# Patient Record
Sex: FEMALE | Race: BLACK OR AFRICAN AMERICAN | NOT HISPANIC OR LATINO | Employment: FULL TIME | URBAN - METROPOLITAN AREA
[De-identification: names, ages, dates, MRNs, and addresses within clinical notes are randomized per-mention and may not be internally consistent; named-entity substitution may affect disease eponyms.]

---

## 2021-08-21 ENCOUNTER — HOSPITAL ENCOUNTER (EMERGENCY)
Facility: HOSPITAL | Age: 41
End: 2021-08-22
Attending: EMERGENCY MEDICINE | Admitting: EMERGENCY MEDICINE
Payer: COMMERCIAL

## 2021-08-21 ENCOUNTER — APPOINTMENT (EMERGENCY)
Dept: CT IMAGING | Facility: HOSPITAL | Age: 41
End: 2021-08-21
Payer: COMMERCIAL

## 2021-08-21 DIAGNOSIS — R29.90 STROKE-LIKE SYMPTOMS: Primary | ICD-10-CM

## 2021-08-21 LAB
ANION GAP SERPL CALCULATED.3IONS-SCNC: 9 MMOL/L (ref 4–13)
APTT PPP: 26 SECONDS (ref 23–37)
BUN SERPL-MCNC: 14 MG/DL (ref 7–25)
CALCIUM SERPL-MCNC: 10.1 MG/DL (ref 8.6–10.5)
CHLORIDE SERPL-SCNC: 96 MMOL/L (ref 98–107)
CO2 SERPL-SCNC: 28 MMOL/L (ref 21–31)
CREAT SERPL-MCNC: 0.91 MG/DL (ref 0.6–1.2)
ERYTHROCYTE [DISTWIDTH] IN BLOOD BY AUTOMATED COUNT: 13 % (ref 11.5–14.5)
GFR SERPL CREATININE-BSD FRML MDRD: 92 ML/MIN/1.73SQ M
GLUCOSE SERPL-MCNC: 106 MG/DL (ref 65–99)
GLUCOSE SERPL-MCNC: 114 MG/DL (ref 65–140)
HCG SERPL QL: NEGATIVE
HCT VFR BLD AUTO: 39.1 % (ref 42–47)
HGB BLD-MCNC: 13.4 G/DL (ref 12–16)
INR PPP: 0.95 (ref 0.84–1.19)
MCH RBC QN AUTO: 31.5 PG (ref 26–34)
MCHC RBC AUTO-ENTMCNC: 34.3 G/DL (ref 31–37)
MCV RBC AUTO: 92 FL (ref 81–99)
PLATELET # BLD AUTO: 268 THOUSANDS/UL (ref 149–390)
PMV BLD AUTO: 9.7 FL (ref 8.6–11.7)
POTASSIUM SERPL-SCNC: 3.3 MMOL/L (ref 3.5–5.5)
PROTHROMBIN TIME: 12.6 SECONDS (ref 11.6–14.5)
RBC # BLD AUTO: 4.25 MILLION/UL (ref 3.9–5.2)
SARS-COV-2 RNA RESP QL NAA+PROBE: NEGATIVE
SODIUM SERPL-SCNC: 133 MMOL/L (ref 134–143)
TROPONIN I SERPL-MCNC: <0.03 NG/ML
WBC # BLD AUTO: 10.1 THOUSAND/UL (ref 4.8–10.8)

## 2021-08-21 PROCEDURE — 99285 EMERGENCY DEPT VISIT HI MDM: CPT

## 2021-08-21 PROCEDURE — 85610 PROTHROMBIN TIME: CPT | Performed by: EMERGENCY MEDICINE

## 2021-08-21 PROCEDURE — 85730 THROMBOPLASTIN TIME PARTIAL: CPT | Performed by: EMERGENCY MEDICINE

## 2021-08-21 PROCEDURE — U0003 INFECTIOUS AGENT DETECTION BY NUCLEIC ACID (DNA OR RNA); SEVERE ACUTE RESPIRATORY SYNDROME CORONAVIRUS 2 (SARS-COV-2) (CORONAVIRUS DISEASE [COVID-19]), AMPLIFIED PROBE TECHNIQUE, MAKING USE OF HIGH THROUGHPUT TECHNOLOGIES AS DESCRIBED BY CMS-2020-01-R: HCPCS | Performed by: EMERGENCY MEDICINE

## 2021-08-21 PROCEDURE — 82948 REAGENT STRIP/BLOOD GLUCOSE: CPT

## 2021-08-21 PROCEDURE — G1004 CDSM NDSC: HCPCS

## 2021-08-21 PROCEDURE — 80048 BASIC METABOLIC PNL TOTAL CA: CPT | Performed by: EMERGENCY MEDICINE

## 2021-08-21 PROCEDURE — 84703 CHORIONIC GONADOTROPIN ASSAY: CPT | Performed by: EMERGENCY MEDICINE

## 2021-08-21 PROCEDURE — 70496 CT ANGIOGRAPHY HEAD: CPT

## 2021-08-21 PROCEDURE — 99285 EMERGENCY DEPT VISIT HI MDM: CPT | Performed by: EMERGENCY MEDICINE

## 2021-08-21 PROCEDURE — 70498 CT ANGIOGRAPHY NECK: CPT

## 2021-08-21 PROCEDURE — 85027 COMPLETE CBC AUTOMATED: CPT | Performed by: EMERGENCY MEDICINE

## 2021-08-21 PROCEDURE — U0005 INFEC AGEN DETEC AMPLI PROBE: HCPCS | Performed by: EMERGENCY MEDICINE

## 2021-08-21 PROCEDURE — 93005 ELECTROCARDIOGRAM TRACING: CPT

## 2021-08-21 PROCEDURE — 84484 ASSAY OF TROPONIN QUANT: CPT | Performed by: EMERGENCY MEDICINE

## 2021-08-21 PROCEDURE — 36415 COLL VENOUS BLD VENIPUNCTURE: CPT | Performed by: EMERGENCY MEDICINE

## 2021-08-21 RX ORDER — CLOPIDOGREL BISULFATE 75 MG/1
75 TABLET ORAL ONCE
Status: COMPLETED | OUTPATIENT
Start: 2021-08-21 | End: 2021-08-21

## 2021-08-21 RX ORDER — VALSARTAN 80 MG/1
80 TABLET ORAL DAILY
Status: ON HOLD | COMMUNITY
Start: 2021-05-08 | End: 2021-08-23 | Stop reason: SDUPTHER

## 2021-08-21 RX ORDER — HYDROCHLOROTHIAZIDE 25 MG/1
TABLET ORAL
COMMUNITY
Start: 2021-05-26

## 2021-08-21 RX ORDER — AMLODIPINE BESYLATE 10 MG/1
10 TABLET ORAL DAILY
COMMUNITY
Start: 2021-05-08

## 2021-08-21 RX ORDER — ATORVASTATIN CALCIUM 40 MG/1
40 TABLET, FILM COATED ORAL
Status: DISCONTINUED | OUTPATIENT
Start: 2021-08-22 | End: 2021-08-22 | Stop reason: HOSPADM

## 2021-08-21 RX ORDER — ASPIRIN 325 MG
325 TABLET ORAL ONCE
Status: COMPLETED | OUTPATIENT
Start: 2021-08-21 | End: 2021-08-21

## 2021-08-21 RX ADMIN — ASPIRIN 325 MG: 325 TABLET ORAL at 22:30

## 2021-08-21 RX ADMIN — IOHEXOL 85 ML: 350 INJECTION, SOLUTION INTRAVENOUS at 19:26

## 2021-08-21 RX ADMIN — CLOPIDOGREL BISULFATE 75 MG: 75 TABLET ORAL at 22:30

## 2021-08-21 NOTE — QUICK NOTE
Stroke alert activated by Dr Fernanda Kaufman in the [address] [place] [address] ED ([phone]) via the emergency       Patient is 45year old woman with hx of HTN, presenting with R sided face, arm, and leg paresthesia  Symptoms began about 4pm   Patient was taking a nap at the time, and the R sided paresthesias began  They continued when she woke up, and a family friend urged her to go to the ED  On exam:   /88  Awake, alert, follows commands  Normal language  No facial weakness, not dysarthric  Sensation in face is normal    Weakness in R , R biceps, R deltoid, R hip flexion, and knee extension  Sensory in arms leg normal     GAIT normal      NIHSS = 0    CTH images reviewed: no hemorrhage  Grey white differentiate intact  CTA H&N images reviewed: no significant atherosclerotic changes in the cervical carotid or vertebral arteries  No large vessel occlusion  AP:    45year old woman with HTN, presenting with R sided paresthesias  NIHSS is 0, but per ED physician, there is weakness, and asymmetry on confrontational motor exam   No findings on her imaging  Cannot rule out a stroke  Does have vascular risk factors  From chart review, should be on 3 antihypertensives, so might be difficult to control HTN  No contraindications to receiving TPA  At this point, would recommend the thrombolytics, and admit for post TPA care, and a stroke evaluation  Formal neurology evaluation

## 2021-08-21 NOTE — ED PROVIDER NOTES
History  Chief Complaint   Patient presents with    Numbness     Pt states numbness on right side of body, right face  Patient is a very pleasant 71-year-old female presenting with right-sided paresthesias and weakness which started approximately 4:00 p m  Patient states she was laying down to go to sleep and she started feeling paresthesias in the right side of her face, but of thought she was cyst and laying in a weird position, and was able to sleep  When she woke up several minutes prior to arrival the paresthesias continue to the entire right side of her body with mild weakness  She denies any headache, vision changes  Patient states she has had history of paresthesias in the past, as well as palpitations however there is no formal diagnosis made  She is not on any blood thinners  She also has a history of hypertension which has been difficult to control with 3 different antihypertensive medications with no recent changes  Extremity Weakness  Location:  Right face, upper, lower extremity  Quality:  Paresthesia, weak  Severity:  Mild  Onset quality:  Sudden  Duration:  3 hours  Timing:  Constant  Progression:  Unchanged  Chronicity:  New  Context:  None  Relieved by:  None  Worsened by:  None  Ineffective treatments:  None  Associated symptoms: no abdominal pain, no chest pain, no congestion, no cough, no diarrhea, no fever, no nausea, no rash, no rhinorrhea, no sore throat, no vomiting and no wheezing        Prior to Admission Medications   Prescriptions Last Dose Informant Patient Reported? Taking?    amLODIPine (NORVASC) 10 mg tablet 8/21/2021 at Unknown time  Yes Yes   Sig: Take 10 mg by mouth daily   hydrochlorothiazide (HYDRODIURIL) 25 mg tablet 8/21/2021 at Unknown time  Yes Yes   Sig: TAKE 1 TABLET BY MOUTH EVERY DAY   valsartan (DIOVAN) 80 mg tablet 8/20/2021 at Unknown time  Yes Yes   Sig: Take 80 mg by mouth daily      Facility-Administered Medications: None       Past Medical History: Diagnosis Date    Hypertension        History reviewed  No pertinent surgical history  History reviewed  No pertinent family history  I have reviewed and agree with the history as documented  E-Cigarette/Vaping     E-Cigarette/Vaping Substances     Social History     Tobacco Use    Smoking status: Never Smoker    Smokeless tobacco: Never Used   Substance Use Topics    Alcohol use: Yes     Comment: Social    Drug use: Never       Review of Systems   Constitutional: Negative for chills and fever  HENT: Negative for congestion, nosebleeds, rhinorrhea and sore throat  Eyes: Negative for pain and visual disturbance  Respiratory: Negative for cough and wheezing  Cardiovascular: Negative for chest pain and leg swelling  Gastrointestinal: Negative for abdominal distention, abdominal pain, diarrhea, nausea and vomiting  Genitourinary: Negative for dysuria and frequency  Musculoskeletal: Positive for extremity weakness  Negative for back pain and joint swelling  Skin: Negative for rash and wound  Neurological: Positive for weakness  Negative for numbness  Psychiatric/Behavioral: Negative for decreased concentration and suicidal ideas  Physical Exam  Physical Exam  Vitals and nursing note reviewed  Constitutional:       Appearance: She is well-developed  HENT:      Head: Normocephalic and atraumatic  Eyes:      Conjunctiva/sclera: Conjunctivae normal       Pupils: Pupils are equal, round, and reactive to light  Neck:      Trachea: No tracheal deviation  Cardiovascular:      Rate and Rhythm: Normal rate and regular rhythm  Heart sounds: Normal heart sounds  No murmur heard  Pulmonary:      Effort: Pulmonary effort is normal  No respiratory distress  Breath sounds: Normal breath sounds  No wheezing or rales  Abdominal:      General: Bowel sounds are normal  There is no distension  Palpations: Abdomen is soft  Tenderness:  There is no abdominal tenderness  Musculoskeletal:         General: No deformity  Cervical back: Normal range of motion and neck supple  Skin:     General: Skin is warm and dry  Capillary Refill: Capillary refill takes less than 2 seconds  Neurological:      Mental Status: She is alert and oriented to person, place, and time  GCS: GCS eye subscore is 4  GCS verbal subscore is 5  GCS motor subscore is 6  Cranial Nerves: Cranial nerves are intact  No cranial nerve deficit  Sensory: Sensation is intact  No sensory deficit  Motor: Weakness present  Coordination: Coordination is intact  Gait: Gait is intact  Comments: No drift, normal facial expression  She does have noticeable decreased strength RUE/RLE in flexion and extension of all joints  She is right hand dominant and states her right side is typically stronger than her left    Sensation is intact bilaterally   Psychiatric:         Judgment: Judgment normal          Vital Signs  ED Triage Vitals [08/21/21 1850]   Temperature Pulse Respirations Blood Pressure SpO2   98 5 °F (36 9 °C) 75 18 127/74 99 %      Temp Source Heart Rate Source Patient Position - Orthostatic VS BP Location FiO2 (%)   Oral Monitor Sitting Left arm --      Pain Score       --           Vitals:    08/21/21 2045 08/21/21 2100 08/21/21 2200 08/21/21 2300   BP: 117/85 120/81 114/69 106/60   Pulse: 75 68 67 75   Patient Position - Orthostatic VS: Sitting Sitting Sitting Lying         Visual Acuity  Visual Acuity      Most Recent Value   L Pupil Size (mm)  3   R Pupil Size (mm)  3   L Pupil Shape  Round   R Pupil Shape  Round          ED Medications  Medications   atorvastatin (LIPITOR) tablet 40 mg (has no administration in time range)   iohexol (OMNIPAQUE) 350 MG/ML injection (SINGLE-DOSE) 85 mL (85 mL Intravenous Given 8/21/21 1926)   aspirin tablet 325 mg (325 mg Oral Given 8/21/21 2230)   clopidogrel (PLAVIX) tablet 75 mg (75 mg Oral Given 8/21/21 2230) Diagnostic Studies  Results Reviewed     Procedure Component Value Units Date/Time    Rapid drug screen, urine [[id number]]     Lab Status: No result Specimen: Urine     hCG, qualitative pregnancy [[id number]]  (Normal) Collected: 08/21/21 1913    Lab Status: Final result Specimen: Blood from Arm, Right Updated: 08/21/21 2257     Preg, Serum Negative    Novel Coronavirus (Covid-19),PCR [facility] - 2 hour stat [[id number]]  (Normal) Collected: 08/21/21 1938    Lab Status: Final result Specimen: Nares from Nose Updated: 08/21/21 2041     SARS-CoV-2 Negative    Narrative: The specimen collection materials, transport medium, and/or testing methodology utilized in the production of these test results have been proven to be reliable in a limited validation with an abbreviated program under the Emergency Utilization Authorization provided by the FDA  Testing reported as "Presumptive positive" will be confirmed with secondary testing to ensure result accuracy  Clinical caution and judgement should be used with the interpretation of these results with consideration of the clinical impression and other laboratory testing  Testing reported as "Positive" or "Negative" has been proven to be accurate according to standard laboratory validation requirements  All testing is performed with control materials showing appropriate reactivity at standard intervals        Troponin I [[id number]]  (Normal) Collected: 08/21/21 1913    Lab Status: Final result Specimen: Blood from Arm, Right Updated: 08/21/21 1938     Troponin I <0 03 ng/mL     Basic metabolic panel [[id number]]  (Abnormal) Collected: 08/21/21 1913    Lab Status: Final result Specimen: Blood from Arm, Right Updated: 08/21/21 1938     Sodium 133 mmol/L      Potassium 3 3 mmol/L      Chloride 96 mmol/L      CO2 28 mmol/L      ANION GAP 9 mmol/L      BUN 14 mg/dL      Creatinine 0 91 mg/dL      Glucose 106 mg/dL      Calcium 10 1 mg/dL      eGFR 92 ml/min/1 73sq m Narrative:      National Kidney Disease Foundation guidelines for Chronic Kidney Disease (CKD):     Stage 1 with normal or high GFR (GFR > 90 mL/min/1 73 square meters)    Stage 2 Mild CKD (GFR = 60-89 mL/min/1 73 square meters)    Stage 3A Moderate CKD (GFR = 45-59 mL/min/1 73 square meters)    Stage 3B Moderate CKD (GFR = 30-44 mL/min/1 73 square meters)    Stage 4 Severe CKD (GFR = 15-29 mL/min/1 73 square meters)    Stage 5 End Stage CKD (GFR <15 mL/min/1 73 square meters)  Note: GFR calculation is accurate only with a steady state creatinine    Protime-INR [[id number]]  (Normal) Collected: 08/21/21 1913    Lab Status: Final result Specimen: Blood from Arm, Right Updated: 08/21/21 1931     Protime 12 6 seconds      INR 0 95    APTT [[id number]]  (Normal) Collected: 08/21/21 1913    Lab Status: Final result Specimen: Blood from Arm, Right Updated: 08/21/21 1931     PTT 26 seconds     CBC and Platelet [[id number]]  (Abnormal) Collected: 08/21/21 1913    Lab Status: Final result Specimen: Blood from Arm, Right Updated: 08/21/21 1920     WBC 10 10 Thousand/uL      RBC 4 25 Million/uL      Hemoglobin 13 4 g/dL      Hematocrit 39 1 %      MCV 92 fL      MCH 31 5 pg      MCHC 34 3 g/dL      RDW 13 0 %      Platelets 513 Thousands/uL      MPV 9 7 fL     Fingerstick Glucose (POCT) [[id number]]  (Normal) Collected: 08/21/21 1908    Lab Status: Final result Updated: 08/21/21 1909     POC Glucose 114 mg/dl                  CTA stroke alert (head/neck)   Final Result by Alcides Schwartz MD (08/21 1957)      No evidence of hemodynamic significant stenosis, aneurysm or dissection  I personally discussed this study with Natacha Archer on 8/21/2021 at 7:22 PM                         Workstation performed: [id number]         CT stroke alert brain   Final Result by Alcides Schwartz MD (08/21 1946)      No acute intracranial abnormality               I personally discussed this study with Natacha Archer on 8/21/2021 at 7:22 PM                 Workstation performed: [id number]                    Procedures  Procedures         ED Course  ED Course as of Aug 21 2310   Sat Aug 21, 2021   2009 After discussion with Dr Alex Brantley, I discussed tPA with patient in detail and offerred it to her  She has had similar symptoms and recovered well, and she does not want tPA  Will admit for stroke protocol      2222 Did Confirm MRI is available, [facility] requests transfer as patient was inside window, young, and would benefit from in person neuro consult                    Stroke Assessment     Row Name 08/21/21 1908             NIH Stroke Scale    Interval  Baseline      Level of Consciousness (1a )  0      LOC Questions (1b )  0      LOC Commands (1c )  0      Best Gaze (2 )  0      Visual (3 )  0      Facial Palsy (4 )  0      Motor Arm, Left (5a )  0      Motor Arm, Right (5b )  0 decreased  strength      Motor Leg, Left (6a )  0      Motor Leg, Right (6b )  0 decreased hip flexion, knee extension, plantar flexion      Limb Ataxia (7 )  0      Sensory (8 )  0      Best Language (9 )  0      Dysarthria (10 )  0      Extinction and Inattention (11 ) (Formerly Neglect)  0      Total  0                                  MDM  Number of Diagnoses or Management Options  Stroke-like symptoms: new and requires workup  Diagnosis management comments: Patient is a 78-year-old female presenting with a paresthesias on the right side  Interestingly her sensation is intact however she does have decreased  strength, elbow flexion, knee extension, hip flexion  She does not have any drift, so her NIHSS is 0, and is not likely tPA candidate  She may become candidate for tPA if symptoms worsen  She does have a history of difficult-to-control blood pressure, as well as palpitations however I do not see any Holter monitor records, perhaps pAF? No palpitations prior to the onset of symptoms         Amount and/or Complexity of Data Reviewed  Review and summarize past medical records: yes  Independent visualization of images, tracings, or specimens: yes    Risk of Complications, Morbidity, and/or Mortality  Presenting problems: high  Diagnostic procedures: minimal  Management options: high        Disposition  Final diagnoses:   Stroke-like symptoms     Time reflects when diagnosis was documented in both MDM as applicable and the Disposition within this note     Time User Action Codes Description Comment    8/21/2021 10:37 PM Kianna Polio Add [R20 2] Paresthesias     8/21/2021 10:37 PM Kianna Polio Remove [R20 2] Paresthesias     8/21/2021 10:37 PM Kianna Polio Add [R29 90] Stroke-like symptoms       ED Disposition     ED Disposition Condition Date/Time Comment    Transfer to Another Facility-In Network  WE Aug 21, 2021 10:21 PM Franci Wood should be transferred out to [facility] Documentation      Most Recent Value   Patient Condition  The patient has been stabilized such that within reasonable medical probability, no material deterioration of the patient condition or the condition of the unborn child(bacilio) is likely to result from the transfer   Reason for Transfer  Level of Care needed not available at this facility   Benefits of Transfer  Specialized equipment and/or services available at the receiving facility (Include comment)________________________   Risks of Transfer  Potential for delay in receiving treatment, Potential deterioration of medical condition, Loss of IV, Increased discomfort during transfer, Possible worsening of condition or death during transfer   Accepting Physician  Narcisa Carpenter MD  [place] [facility]   Provider Certification  Risk of worsening condition, Unanticipated needs of medical equipment and personnel during transport, General risk, such as traffic hazards, adverse weather conditions, rough terrain or turbulence, possible failure of equipment (including vehicle or aircraft), or consequences of actions of persons outside the control of the transport personnel, The possibility of a transport vehicle being unavailable      Follow-up Information    None         Patient's Medications   Discharge Prescriptions    No medications on file     No discharge procedures on file      PDMP Review     None          ED Provider  Electronically Signed by           Radha Daniels DO  08/21/21 8675

## 2021-08-22 ENCOUNTER — APPOINTMENT (INPATIENT)
Dept: MRI IMAGING | Facility: HOSPITAL | Age: 41
DRG: 103 | End: 2021-08-22
Payer: COMMERCIAL

## 2021-08-22 ENCOUNTER — HOSPITAL ENCOUNTER (INPATIENT)
Facility: HOSPITAL | Age: 41
LOS: 1 days | Discharge: HOME | DRG: 103 | End: 2021-08-23
Attending: INTERNAL MEDICINE | Admitting: INTERNAL MEDICINE
Payer: COMMERCIAL

## 2021-08-22 VITALS
HEIGHT: 61 IN | BODY MASS INDEX: 23.6 KG/M2 | DIASTOLIC BLOOD PRESSURE: 64 MMHG | HEART RATE: 74 BPM | OXYGEN SATURATION: 98 % | WEIGHT: 125 LBS | TEMPERATURE: 98.5 F | RESPIRATION RATE: 18 BRPM | SYSTOLIC BLOOD PRESSURE: 110 MMHG

## 2021-08-22 DIAGNOSIS — R29.90 STROKE-LIKE SYMPTOMS: Primary | ICD-10-CM

## 2021-08-22 DIAGNOSIS — I10 HTN (HYPERTENSION): ICD-10-CM

## 2021-08-22 LAB
AMPHETAMINES SERPL QL SCN: NEGATIVE
ANION GAP SERPL CALCULATED.3IONS-SCNC: 10 MMOL/L (ref 4–13)
ATRIAL RATE: 83 BPM
BARBITURATES UR QL: NEGATIVE
BENZODIAZ UR QL: NEGATIVE
BUN SERPL-MCNC: 17 MG/DL (ref 5–25)
CALCIUM SERPL-MCNC: 8.6 MG/DL (ref 8.3–10.1)
CHLORIDE SERPL-SCNC: 99 MMOL/L (ref 100–108)
CHOLEST SERPL-MCNC: 195 MG/DL (ref 50–200)
CO2 SERPL-SCNC: 27 MMOL/L (ref 21–32)
COCAINE UR QL: NEGATIVE
CREAT SERPL-MCNC: 0.88 MG/DL (ref 0.6–1.3)
EST. AVERAGE GLUCOSE BLD GHB EST-MCNC: 108 MG/DL
GFR SERPL CREATININE-BSD FRML MDRD: 96 ML/MIN/1.73SQ M
GLUCOSE SERPL-MCNC: 97 MG/DL (ref 65–140)
HBA1C MFR BLD: 5.4 %
HDLC SERPL-MCNC: 89 MG/DL
LDLC SERPL CALC-MCNC: 99 MG/DL (ref 0–100)
MAGNESIUM SERPL-MCNC: 2.1 MG/DL (ref 1.6–2.6)
METHADONE UR QL: NEGATIVE
OPIATES UR QL SCN: NEGATIVE
OXYCODONE+OXYMORPHONE UR QL SCN: NEGATIVE
P AXIS: 73 DEGREES
PCP UR QL: NEGATIVE
PHOSPHATE SERPL-MCNC: 4.7 MG/DL (ref 2.7–4.5)
POTASSIUM SERPL-SCNC: 3.4 MMOL/L (ref 3.5–5.3)
PR INTERVAL: 156 MS
QRS AXIS: 61 DEGREES
QRSD INTERVAL: 82 MS
QT INTERVAL: 382 MS
QTC INTERVAL: 448 MS
SODIUM SERPL-SCNC: 136 MMOL/L (ref 136–145)
T WAVE AXIS: 53 DEGREES
THC UR QL: NEGATIVE
TRIGL SERPL-MCNC: 34 MG/DL
TSH SERPL DL<=0.05 MIU/L-ACNC: 1.18 UIU/ML (ref 0.36–3.74)
VENTRICULAR RATE: 83 BPM
VIT B12 SERPL-MCNC: 452 PG/ML (ref 100–900)

## 2021-08-22 PROCEDURE — 80307 DRUG TEST PRSMV CHEM ANLYZR: CPT | Performed by: INTERNAL MEDICINE

## 2021-08-22 PROCEDURE — 84100 ASSAY OF PHOSPHORUS: CPT | Performed by: PHYSICIAN ASSISTANT

## 2021-08-22 PROCEDURE — 93010 ELECTROCARDIOGRAM REPORT: CPT | Performed by: INTERNAL MEDICINE

## 2021-08-22 PROCEDURE — 80061 LIPID PANEL: CPT | Performed by: PHYSICIAN ASSISTANT

## 2021-08-22 PROCEDURE — 83036 HEMOGLOBIN GLYCOSYLATED A1C: CPT | Performed by: PHYSICIAN ASSISTANT

## 2021-08-22 PROCEDURE — 70551 MRI BRAIN STEM W/O DYE: CPT

## 2021-08-22 PROCEDURE — 99222 1ST HOSP IP/OBS MODERATE 55: CPT | Performed by: INTERNAL MEDICINE

## 2021-08-22 PROCEDURE — 84443 ASSAY THYROID STIM HORMONE: CPT | Performed by: PHYSICIAN ASSISTANT

## 2021-08-22 PROCEDURE — 80048 BASIC METABOLIC PNL TOTAL CA: CPT | Performed by: PHYSICIAN ASSISTANT

## 2021-08-22 PROCEDURE — 84207 ASSAY OF VITAMIN B-6: CPT | Performed by: PHYSICIAN ASSISTANT

## 2021-08-22 PROCEDURE — 83735 ASSAY OF MAGNESIUM: CPT | Performed by: PHYSICIAN ASSISTANT

## 2021-08-22 PROCEDURE — 82607 VITAMIN B-12: CPT | Performed by: PHYSICIAN ASSISTANT

## 2021-08-22 PROCEDURE — 84425 ASSAY OF VITAMIN B-1: CPT | Performed by: PHYSICIAN ASSISTANT

## 2021-08-22 PROCEDURE — 99254 IP/OBS CNSLTJ NEW/EST MOD 60: CPT | Performed by: PSYCHIATRY & NEUROLOGY

## 2021-08-22 PROCEDURE — G1004 CDSM NDSC: HCPCS

## 2021-08-22 RX ORDER — POTASSIUM CHLORIDE 20 MEQ/1
20 TABLET, EXTENDED RELEASE ORAL ONCE
Status: COMPLETED | OUTPATIENT
Start: 2021-08-22 | End: 2021-08-22

## 2021-08-22 RX ORDER — MAGNESIUM SULFATE 1 G/100ML
1 INJECTION INTRAVENOUS 2 TIMES DAILY
Status: COMPLETED | OUTPATIENT
Start: 2021-08-22 | End: 2021-08-23

## 2021-08-22 RX ORDER — DIPHENHYDRAMINE HYDROCHLORIDE 50 MG/ML
25 INJECTION INTRAMUSCULAR; INTRAVENOUS EVERY 8 HOURS
Status: COMPLETED | OUTPATIENT
Start: 2021-08-22 | End: 2021-08-23

## 2021-08-22 RX ORDER — ASPIRIN 81 MG/1
81 TABLET, CHEWABLE ORAL DAILY
Status: DISCONTINUED | OUTPATIENT
Start: 2021-08-22 | End: 2021-08-23 | Stop reason: HOSPADM

## 2021-08-22 RX ORDER — LANOLIN ALCOHOL/MO/W.PET/CERES
6 CREAM (GRAM) TOPICAL
Status: DISCONTINUED | OUTPATIENT
Start: 2021-08-22 | End: 2021-08-23 | Stop reason: HOSPADM

## 2021-08-22 RX ORDER — METOCLOPRAMIDE HYDROCHLORIDE 5 MG/ML
10 INJECTION INTRAMUSCULAR; INTRAVENOUS EVERY 8 HOURS
Status: COMPLETED | OUTPATIENT
Start: 2021-08-22 | End: 2021-08-23

## 2021-08-22 RX ORDER — MAGNESIUM HYDROXIDE/ALUMINUM HYDROXICE/SIMETHICONE 120; 1200; 1200 MG/30ML; MG/30ML; MG/30ML
30 SUSPENSION ORAL EVERY 6 HOURS PRN
Status: DISCONTINUED | OUTPATIENT
Start: 2021-08-22 | End: 2021-08-23 | Stop reason: HOSPADM

## 2021-08-22 RX ORDER — KETOROLAC TROMETHAMINE 30 MG/ML
15 INJECTION, SOLUTION INTRAMUSCULAR; INTRAVENOUS EVERY 8 HOURS
Status: DISCONTINUED | OUTPATIENT
Start: 2021-08-22 | End: 2021-08-23 | Stop reason: HOSPADM

## 2021-08-22 RX ORDER — ACETAMINOPHEN 325 MG/1
650 TABLET ORAL EVERY 4 HOURS PRN
Status: DISCONTINUED | OUTPATIENT
Start: 2021-08-22 | End: 2021-08-23 | Stop reason: HOSPADM

## 2021-08-22 RX ORDER — ONDANSETRON 2 MG/ML
4 INJECTION INTRAMUSCULAR; INTRAVENOUS EVERY 6 HOURS PRN
Status: DISCONTINUED | OUTPATIENT
Start: 2021-08-22 | End: 2021-08-23 | Stop reason: HOSPADM

## 2021-08-22 RX ADMIN — KETOROLAC TROMETHAMINE 15 MG: 30 INJECTION, SOLUTION INTRAMUSCULAR; INTRAVENOUS at 15:15

## 2021-08-22 RX ADMIN — MAGNESIUM SULFATE HEPTAHYDRATE 1 G: 1 INJECTION, SOLUTION INTRAVENOUS at 15:29

## 2021-08-22 RX ADMIN — METOCLOPRAMIDE 10 MG: 5 INJECTION, SOLUTION INTRAMUSCULAR; INTRAVENOUS at 15:16

## 2021-08-22 RX ADMIN — MAGNESIUM SULFATE HEPTAHYDRATE 1 G: 1 INJECTION, SOLUTION INTRAVENOUS at 22:31

## 2021-08-22 RX ADMIN — KETOROLAC TROMETHAMINE 15 MG: 30 INJECTION, SOLUTION INTRAMUSCULAR; INTRAVENOUS at 22:32

## 2021-08-22 RX ADMIN — DIPHENHYDRAMINE HYDROCHLORIDE 25 MG: 50 INJECTION, SOLUTION INTRAMUSCULAR; INTRAVENOUS at 22:34

## 2021-08-22 RX ADMIN — POTASSIUM CHLORIDE 20 MEQ: 1500 TABLET, EXTENDED RELEASE ORAL at 18:03

## 2021-08-22 RX ADMIN — POTASSIUM CHLORIDE 20 MEQ: 1500 TABLET, EXTENDED RELEASE ORAL at 09:08

## 2021-08-22 RX ADMIN — METOCLOPRAMIDE 10 MG: 5 INJECTION, SOLUTION INTRAMUSCULAR; INTRAVENOUS at 22:33

## 2021-08-22 RX ADMIN — ASPIRIN 81 MG: 81 TABLET, CHEWABLE ORAL at 09:08

## 2021-08-22 RX ADMIN — DIPHENHYDRAMINE HYDROCHLORIDE 25 MG: 50 INJECTION, SOLUTION INTRAMUSCULAR; INTRAVENOUS at 15:17

## 2021-08-22 NOTE — ASSESSMENT & PLAN NOTE
Patient presents with right-sided facial weakness and right-sided body weakness/numbness  States she took a nap at 4:00 p m , woke up with numbness her right face and right body but was laying on her right side and thought that was the cause, rolled over and went back to sleep  Woke back up later with symptoms still present prompting patient to come to the ED  Also endorses insomnia ( avg 2-3hrs of sleep/night) and random pain in her veins (arms, legs, temple)  Denies fevers, chills, vision changes, n/v   Evaluated at [facility] with neuro consult who recommended tPA, but patient refused as she has had this happened to her before and it resolved on its own  Transferred to [address] [facility] for inpatient neurology assessment and brain MRI  On arrival to [facility], patient's symptoms almost completely resolved  VS stable  On exam: No focal neuro deficits  CN II-XII intact  Heel-to-shin negative, finger-to-nose negative  Mild hypokalemia at 3 4, would not explain her symptoms  UDS negative  CT head- no acute intracranial abnormalities  CTA head/neck - No evidence of hemodynamic significant stenosis, aneurysm or dissection  Given ipsilateral findings and resolution of symptoms, CVA is less likely  Cannot exclude TIA but if this was a R sided ischemic event with R sided facial numbness, would expect contralateral body symptoms  Pt does not have occular findings, but is in the right demographic for MS  (pt denies family hx of autoimmune disorders)    Plan:  - Continue stroke pathway for now with neurochecks q4h  - ASA 81mg daily  - Pt gets regular blood work with PCP, no evidence of hyperlipidemia, will hold off on statin  - Will recheck TSH  Also added vitamin B1,B6,and B12 levels  - Neuro consult  Appreciate recs

## 2021-08-22 NOTE — PLAN OF CARE
Problem: Potential for Falls  Goal: Patient will remain free of falls  Description: INTERVENTIONS:  - Educate patient/family on patient safety including physical limitations  - Instruct patient to call for assistance with activity   - Consult OT/PT to assist with strengthening/mobility   - Keep Call bell within reach  - Keep bed low and locked with side rails adjusted as appropriate  - Keep care items and personal belongings within reach  - Initiate and maintain comfort rounds  - Make Fall Risk Sign visible to staff  - Offer Toileting every  Hours, in advance of need  - Initiate/Maintain alarm  - Obtain necessary fall risk management equipment:   - Apply yellow socks and bracelet for high fall risk patients  - Consider moving patient to room near nurses station  Outcome: Progressing     Problem: PAIN - ADULT  Goal: Verbalizes/displays adequate comfort level or baseline comfort level  Description: Interventions:  - Encourage patient to monitor pain and request assistance  - Assess pain using appropriate pain scale  - Administer analgesics based on type and severity of pain and evaluate response  - Implement non-pharmacological measures as appropriate and evaluate response  - Consider cultural and social influences on pain and pain management  - Notify physician/advanced practitioner if interventions unsuccessful or patient reports new pain  Outcome: Progressing     Problem: INFECTION - ADULT  Goal: Absence or prevention of progression during hospitalization  Description: INTERVENTIONS:  - Assess and monitor for signs and symptoms of infection  - Monitor lab/diagnostic results  - Monitor all insertion sites, i e  indwelling lines, tubes, and drains  - Monitor endotracheal if appropriate and nasal secretions for changes in amount and color  - [place] appropriate cooling/warming therapies per order  - Administer medications as ordered  - Instruct and encourage patient and family to use good hand hygiene technique  - Identify and instruct in appropriate isolation precautions for identified infection/condition  Outcome: Progressing     Problem: SAFETY ADULT  Goal: Patient will remain free of falls  Description: INTERVENTIONS:  - Educate patient/family on patient safety including physical limitations  - Instruct patient to call for assistance with activity   - Consult OT/PT to assist with strengthening/mobility   - Keep Call bell within reach  - Keep bed low and locked with side rails adjusted as appropriate  - Keep care items and personal belongings within reach  - Initiate and maintain comfort rounds  - Make Fall Risk Sign visible to staff  - Offer Toileting every  Hours, in advance of need  - Initiate/Maintain alarm  - Obtain necessary fall risk management equipment:   - Apply yellow socks and bracelet for high fall risk patients  - Consider moving patient to room near nurses station  Outcome: Progressing  Goal: Maintain or return to baseline ADL function  Description: INTERVENTIONS:  -  Assess patient's ability to carry out ADLs; assess patient's baseline for ADL function and identify physical deficits which impact ability to perform ADLs (bathing, care of mouth/teeth, toileting, grooming, dressing, etc )  - Assess/evaluate cause of self-care deficits   - Assess range of motion  - Assess patient's mobility; develop plan if impaired  - Assess patient's need for assistive devices and provide as appropriate  - Encourage maximum independence but intervene and supervise when necessary  - Involve family in performance of ADLs  - Assess for home care needs following discharge   - Consider OT consult to assist with ADL evaluation and planning for discharge  - Provide patient education as appropriate  Outcome: Progressing  Goal: Maintains/Returns to pre admission functional level  Description: INTERVENTIONS:  - Perform BMAT or MOVE assessment daily    - Set and communicate daily mobility goal to care team and patient/family/caregiver  - Collaborate with rehabilitation services on mobility goals if consulted  - Perform Range of Motion  times a day  - Reposition patient every  hours  - Dangle patient times a day  - Stand patient  times a day  - Ambulate patient times a day  - Out of bed to chair times a day   - Out of bed for meals times a day  - Out of bed for toileting  - Record patient progress and toleration of activity level   Outcome: Progressing     Problem: DISCHARGE PLANNING  Goal: Discharge to home or other facility with appropriate resources  Description: INTERVENTIONS:  - Identify barriers to discharge w/patient and caregiver  - Arrange for needed discharge resources and transportation as appropriate  - Identify discharge learning needs (meds, wound care, etc )  - Arrange for interpretive services to assist at discharge as needed  - Refer to Case Management Department for coordinating discharge planning if the patient needs post-hospital services based on physician/advanced practitioner order or complex needs related to functional status, cognitive ability, or social support system  Outcome: Progressing     Problem: Knowledge Deficit  Goal: Patient/family/caregiver demonstrates understanding of disease process, treatment plan, medications, and discharge instructions  Description: Complete learning assessment and assess knowledge base  Interventions:  - Provide teaching at level of understanding  - Provide teaching via preferred learning methods  Outcome: Progressing     Problem: Neurological Deficit  Goal: Neurological status is stable or improving  Description: Interventions:  - Monitor and assess patient's level of consciousness, motor function, sensory function, and level of assistance needed for ADLs  - Monitor and report changes from baseline  Collaborate with interdisciplinary team to initiate plan and implement interventions as ordered  - Provide and maintain a safe environment  - Consider seizure precautions    - Consider fall precautions  - Consider aspiration precautions  - Consider bleeding precautions  Outcome: Progressing     Problem: Activity Intolerance/Impaired Mobility  Goal: Mobility/activity is maintained at optimum level for patient  Description: Interventions:  - Assess and monitor patient  barriers to mobility and need for assistive/adaptive devices  - Assess patient's emotional response to limitations  - Collaborate with interdisciplinary team and initiate plans and interventions as ordered  - Encourage independent activity per ability   - Maintain proper body alignment  - Perform active/passive rom as tolerated/ordered  - Plan activities to conserve energy   - Turn patient as appropriate  Outcome: Progressing     Problem: Communication Impairment  Goal: Ability to express needs and understand communication  Description: Assess patient's communication skills and ability to understand information  Patient will demonstrate use of effective communication techniques, alternative methods of communication and understanding even if not able to speak  - Encourage communication and provide alternate methods of communication as needed  - Collaborate with case management/ for discharge needs  - Include patient/family/caregiver in decisions related to communication  Outcome: Progressing     Problem: Potential for Aspiration  Goal: Non-ventilated patient's risk of aspiration is minimized  Description: Assess and monitor vital signs, respiratory status, and labs (WBC)  Monitor for signs of aspiration (tachypnea, cough, rales, wheezing, cyanosis, fever)  - Assess and monitor patient's ability to swallow  - Place patient up in chair to eat if possible  - HOB up at 90 degrees to eat if unable to get patient up into chair   - Supervise patient during oral intake  - Instruct patient/ family to take small bites  - Instruct patient/ family to take small single sips when taking liquids    - Follow patient-specific strategies generated by speech pathologist   Outcome: Progressing  Goal: Ventilated patient's risk of aspiration is minimized  Description: Assess and monitor vital signs, respiratory status, airway cuff pressure, and labs (WBC)  Monitor for signs of aspiration (tachypnea, cough, rales, wheezing, cyanosis, fever)  - Elevate head of bed 30 degrees if patient has tube feeding   - Monitor tube feeding  Outcome: Progressing     Problem: Nutrition  Goal: Nutrition/Hydration status is improving  Description: Monitor and assess patient's nutrition/hydration status for malnutrition (ex- brittle hair, bruises, dry skin, pale skin and conjunctiva, muscle wasting, smooth red tongue, and disorientation)  Collaborate with interdisciplinary team and initiate plan and interventions as ordered  Monitor patient's weight and dietary intake as ordered or per policy  Utilize nutrition screening tool and intervene per policy  Determine patient's food preferences and provide high-protein, high-caloric foods as appropriate  - Assist patient with eating   - Allow adequate time for meals   - Encourage patient to take dietary supplement as ordered  - Collaborate with clinical nutritionist   - Include patient/family/caregiver in decisions related to nutrition    Outcome: Progressing

## 2021-08-22 NOTE — H&P
[address] [facility] 1980, 45 y o  female MRN: [id number]  Unit/Bed#: E4 -01 Encounter: [id number]  Primary Care Provider: Valentín Hoffman MD   Date and time admitted to hospital: 8/22/2021  4:11 AM    * Stroke-like symptoms  Assessment & Plan  Patient presents with right-sided facial weakness and right-sided body weakness/numbness  States she took a nap at 4:00 p m , woke up with numbness her right face and right body but was laying on her right side and thought that was the cause, rolled over and went back to sleep  Woke back up later with symptoms still present prompting patient to come to the ED  Also endorses insomnia ( avg 2-3hrs of sleep/night) and random pain in her veins (arms, legs, temple)  Denies fevers, chills, vision changes, n/v   Evaluated at [facility] with neuro consult who recommended tPA, but patient refused as she has had this happened to her before and it resolved on its own  Transferred to [address] for inpatient neurology assessment and brain MRI  On arrival to [facility], patient's symptoms almost completely resolved  VS stable  On exam: No focal neuro deficits  CN II-XII intact  Heel-to-shin negative, finger-to-nose negative  Mild hypokalemia at 3 4, would not explain her symptoms  UDS negative  CT head- no acute intracranial abnormalities  CTA head/neck - No evidence of hemodynamic significant stenosis, aneurysm or dissection  Given ipsilateral findings and resolution of symptoms, CVA is less likely  Cannot exclude TIA but if this was a R sided ischemic event with R sided facial numbness, would expect contralateral body symptoms  Pt does not have occular findings, but is in the right demographic for MS  (pt denies family hx of autoimmune disorders)    Plan:  - Continue stroke pathway for now with neurochecks q4h  - ASA 81mg daily  - Pt gets regular blood work with PCP, no evidence of hyperlipidemia, will hold off on statin  - Will recheck TSH  Also added vitamin B1,B6,and B12 levels  - Neuro consult  Appreciate recs  HTN (hypertension)  Assessment & Plan  Patient is on amlodipine, HCTZ, valsartan  BP on arrival to [facility] is 100/62 (states this is abnormally low for her)  Will hold antihypertensives until BP improves      VTE Prophylaxis: Ambulate patient  / sequential compression device   Code Status:  Level 1 full code  POLST: There is no POLST form on file for this patient (pre-hospital)  Discussion with family:  Patient    Anticipated Length of Stay:  Patient will be admitted on an Inpatient basis with an anticipated length of stay of  greater 2 midnights  Justification for Hospital Stay:  CVA rule out    Total Time for Visit, including Counseling / Coordination of Care: 30 minutes  Greater than 50% of this total time spent on direct patient counseling and coordination of care  Chief Complaint:   Stroke-like symptoms    History of Present Illness:    Angely Tomlinson is a 45 y o  female with PMH HTN who presents with right-sided facial numbness and right-sided body numbness/weakness  Patient states that she took a nap at 4:00 p m  And woke up with right-sided facial numbness and right-sided body numbness/weakness, which she attributed to sleeping on her right side  She rolled over and went back to sleep  Woke up and symptoms persisted, prompting patient to come to [facility] ED  While at [facility], stroke alert was called  CT head was negative for any intracranial abnormalities, CTA was negative as well  Given symptoms, tPA was offered but patient denied since she has had this happen before and resolved on its own  She was transferred to [facility] for formal neurologic workup and MRI of the brain  Arrival to [facility], patient's symptoms almost fully resolved  No focal neural deficits  Lab work is unremarkable except for mild hypokalemia with potassium 3 4       Review of Systems:    Review of Systems   Constitutional: Negative for appetite change, chills, fatigue and fever  HENT: Negative for ear pain, sore throat and trouble swallowing  Eyes: Negative for visual disturbance  Respiratory: Negative for cough, chest tightness, shortness of breath and wheezing  Cardiovascular: Negative for chest pain, palpitations and leg swelling  Gastrointestinal: Negative for abdominal distention, abdominal pain, diarrhea, nausea and vomiting  Endocrine: Negative  Genitourinary: Negative for dysuria  Musculoskeletal: Negative for gait problem and myalgias  Skin: Negative for pallor  Allergic/Immunologic: Negative for immunocompromised state  Neurological: Positive for facial asymmetry (R sided droop), weakness (R sided) and numbness (R sided face and body)  Negative for dizziness, syncope, light-headedness and headaches  Past Medical and Surgical History:     Past Medical History:   Diagnosis Date    Hypertension        No past surgical history on file  Meds/Allergies:    Prior to Admission medications    Medication Sig Start Date End Date Taking? Authorizing Provider   amLODIPine (NORVASC) 10 mg tablet Take 10 mg by mouth daily 5/8/21  Yes Historical Provider, MD   hydrochlorothiazide (HYDRODIURIL) 25 mg tablet TAKE 1 TABLET BY MOUTH EVERY DAY 5/26/21  Yes Historical Provider, MD   valsartan (DIOVAN) 80 mg tablet Take 80 mg by mouth daily 5/8/21  Yes Historical Provider, MD     I have reviewed home medications with patient personally      Allergies: No Known Allergies    Social History:     Marital Status: /Civil Union   Occupation:  Noncontributory  Patient Pre-hospital Living Situation:  Home  Patient Pre-hospital Level of Mobility:  Independent  Patient Pre-hospital Diet Restrictions:  None  Substance Use History:   Social History     Substance and Sexual Activity   Alcohol Use Yes    Comment: Social     Social History     Tobacco Use   Smoking Status Never Smoker   Smokeless Tobacco Never Used     Social History     Substance and Sexual Activity   Drug Use Never       Family History:    non-contributory    Physical Exam:     Vitals:   Blood Pressure: 100/69 (08/22/21 0500)  Pulse: 74 (08/22/21 0500)  Temperature: 98 3 °F (36 8 °C) (08/22/21 0500)  Temp Source: Temporal (08/22/21 0500)  Respirations: 18 (08/22/21 0500)  Height: 5' 1" (154 9 cm) (08/22/21 0400)  Weight - Scale: 57 8 kg (127 lb 6 8 oz) (08/22/21 0400)  SpO2: 98 % (08/22/21 0500)    Physical Exam  Vitals and nursing note reviewed  Constitutional:       Appearance: Normal appearance  HENT:      Head: Normocephalic and atraumatic  Mouth/Throat:      Mouth: Mucous membranes are moist       Pharynx: Oropharynx is clear  No oropharyngeal exudate  Eyes:      Extraocular Movements: Extraocular movements intact  Cardiovascular:      Rate and Rhythm: Normal rate and regular rhythm  Pulses: Normal pulses  Heart sounds: Normal heart sounds  No murmur heard  No friction rub  No gallop  Pulmonary:      Effort: Pulmonary effort is normal  No respiratory distress  Breath sounds: Normal breath sounds  No stridor  No wheezing or rales  Abdominal:      General: Abdomen is flat  Bowel sounds are normal  There is no distension  Palpations: Abdomen is soft  Tenderness: There is no abdominal tenderness  Musculoskeletal:      Right lower leg: No edema  Left lower leg: No edema  Skin:     General: Skin is warm and dry  Neurological:      General: No focal deficit present  Mental Status: She is alert and oriented to person, place, and time  Cranial Nerves: No cranial nerve deficit  Sensory: No sensory deficit  Motor: No weakness  Gait: Gait normal        Additional Data:     Lab Results: I have personally reviewed pertinent reports        Results from last 7 days   Lab Units 08/21/21  1913   WBC Thousand/uL 10 10   HEMOGLOBIN g/dL 13 4   HEMATOCRIT % 39 1*   PLATELETS Thousands/uL 268     Results from last 7 days   Lab Units 08/21/21 1913   SODIUM mmol/L 133*   POTASSIUM mmol/L 3 3*   CHLORIDE mmol/L 96*   CO2 mmol/L 28   BUN mg/dL 14   CREATININE mg/dL 0 91   ANION GAP mmol/L 9   CALCIUM mg/dL 10 1   GLUCOSE RANDOM mg/dL 106*     Results from last 7 days   Lab Units 08/21/21 1913   INR  0 95     Results from last 7 days   Lab Units 08/21/21  1908   POC GLUCOSE mg/dl 114               Imaging: I have personally reviewed pertinent reports  MRI Inpatient Order    (Results Pending)       EKG, Pathology, and Other Studies Reviewed on Admission:     [facility] Records Reviewed: Yes     ** Please Note: This note has been constructed using a voice recognition system   **

## 2021-08-22 NOTE — ASSESSMENT & PLAN NOTE
Patient is on amlodipine, HCTZ, valsartan  BP on arrival to [facility] is 100/62 (states this is abnormally low for her)  Will hold antihypertensives until BP improves

## 2021-08-22 NOTE — QUICK NOTE
Patient signed out to me by admitting shift provider that she may be a potential admission for rule out CVA  ER contacted me regarding possible admission of this patient I reached out to my on-call attending physician Dr Dinorah James who recommended the patient be transferred to a more appropriate facility where they had availability of in house neurology as well as the ability to obtain an echocardiogram and MRI in a timely manner  Patient is young and was deemed a tPA candidate by Neurology and warrants more timely workup

## 2021-08-22 NOTE — EMTALA/ACUTE CARE TRANSFER
[address] [facility]  [address] [id number] [phone]  Dept: [phone]      EMTALA TRANSFER CONSENT    NAME Georgina Israel                                         1980                              MRN [id number]    I have been informed of my rights regarding examination, treatment, and transfer   by Dr Anthony Dewitt DO    Benefits:      Risks:        Consent for Transfer:  I acknowledge that my medical condition has been evaluated and explained to me by the emergency department physician or other qualified medical person and/or my attending physician, who has recommended that I be transferred to the service of    at    The above potential benefits of such transfer, the potential risks associated with such transfer, and the probable risks of not being transferred have been explained to me, and I fully understand them  The doctor has explained that, in my case, the benefits of transfer outweigh the risks  I agree to be transferred  I authorize the performance of emergency medical procedures and treatments upon me in both transit and upon arrival at the receiving facility  Additionally, I authorize the release of any and all medical records to the receiving facility and request they be transported with me, if possible  I understand that the safest mode of transportation during a medical emergency is an ambulance and that the Hospital advocates the use of this mode of transport  Risks of traveling to the receiving facility by car, including absence of medical control, life sustaining equipment, such as oxygen, and medical personnel has been explained to me and I fully understand them  (JOHNNY CORRECT BOX BELOW)  [  ]  I consent to the stated transfer and to be transported by ambulance/helicopter  [  ]  I consent to the stated transfer, but refuse transportation by ambulance and accept full responsibility for my transportation by car    I understand the risks of non-ambulance transfers and I exonerate the Hospital and its staff from any deterioration in my condition that results from this refusal     X___________________________________________    DATE  21  TIME________  Signature of patient or legally responsible individual signing on patient behalf           RELATIONSHIP TO PATIENT_________________________          Provider Certification    NAME Kristina Parker                                         1980                              MRN [id number]    A medical screening exam was performed on the above named patient  Based on the examination:    Condition Necessitating Transfer The encounter diagnosis was Stroke-like symptoms  Patient Condition:      Reason for Transfer:      Transfer Requirements: Facility     · Space available and qualified personnel available for treatment as acknowledged by    · Agreed to accept transfer and to provide appropriate medical treatment as acknowledged by          · Appropriate medical records of the examination and treatment of the patient are provided at the time of transfer   [address] [facility], [address] _______  · Transfer will be performed by qualified personnel from    and appropriate transfer equipment as required, including the use of necessary and appropriate life support measures      Provider Certification: I have examined the patient and explained the following risks and benefits of being transferred/refusing transfer to the patient/family:         Based on these reasonable risks and benefits to the patient and/or the unborn child(bacilio), and based upon the information available at the time of the patients examination, I certify that the medical benefits reasonably to be expected from the provision of appropriate medical treatments at another medical facility outweigh the increasing risks, if any, to the individuals medical condition, and in the case of labor to the unborn child, from effecting the transfer      X____________________________________________ DATE 08/21/21        TIME_______      ORIGINAL - SEND TO MEDICAL RECORDS   COPY - SEND WITH PATIENT DURING TRANSFER

## 2021-08-22 NOTE — ASSESSMENT & PLAN NOTE
55-year-old female with history of hypertension and migraines who initially presented to [facility] on 08/21 with right facial arm and leg paresthesias  Stroke alert was initiated  NIH was found to be 0  However, ED physician felt as though there was weakness and asymmetry on motor exam   Patient was deemed to be a tPA candidate and tPA was recommended  Patient refused thrombolytic treatment as she has reported episodes of this in the past which resolved spontaneously  MRi negative, can discontinue stroke pathway at this time  Patient reports chronic headaches and migraines  Will treat as complicated migraine       Plan:  - MRI brain negative for acute infarct; discontinue stroke pathway  - Will trial migraine cocktail:  ·  Benadryl 25 mg IV q 8  ·  Reglan 10 mg IV q 8  ·  Toradol 15 mg IV q 8  ·  Magnesium sulfate 1,000 mg BID  - Continue IVF for hydration  - Continue telemetry  - Contact neurology with acute change in examination

## 2021-08-22 NOTE — ASSESSMENT & PLAN NOTE
Patient with history of hypertension  Blood pressure on arrival 113/75  Patient follows a [facility]  Patient's home antihypertensive regimen includes:  Amlodipine 10 mg daily, hydrochlorothiazide 25 mg daily, valsartan 80 mg daily

## 2021-08-22 NOTE — CONSULTS
Consultation - Neurology   [facility] WILLY [facility] 45 y o  female MRN: [id number]  Unit/Bed#: E4 -01 Encounter: [id number]      Assessment/Plan     * Stroke-like symptoms  Assessment & Plan  18-year-old female with history of hypertension and migraines who initially presented to [facility] on 08/21 with right facial arm and leg paresthesias  Stroke alert was initiated  NIH was found to be 0  However, ED physician felt as though there was weakness and asymmetry on motor exam   Patient was deemed to be a tPA candidate and tPA was recommended  Patient refused thrombolytic treatment as she has reported episodes of this in the past which resolved spontaneously  MRi negative, can discontinue stroke pathway at this time  Patient reports chronic headaches and migraines  Will treat as complicated migraine  Plan:  - MRI brain negative for acute infarct; discontinue stroke pathway  - Will trial migraine cocktail:  ·  Benadryl 25 mg IV q 8  ·  Reglan 10 mg IV q 8  ·  Toradol 15 mg IV q 8  ·  Magnesium sulfate 1,000 mg BID  - Continue IVF for hydration  - Continue telemetry  - Contact neurology with acute change in examination    HTN (hypertension)  Assessment & Plan  Patient with history of hypertension  Blood pressure on arrival 113/75  Patient follows a [facility]  Patient's home antihypertensive regimen includes:  Amlodipine 10 mg daily, hydrochlorothiazide 25 mg daily, valsartan 80 mg daily  [facility] can follow up with outpatient neurology, headache specialist,  if she is interested in ongoing headache and migraine management  History of Present Illness     Reason for Consult / Principal Problem: stroke like symptoms  HPI: ADDY AGUILERA [facility] is a 45 y o  female with hypertension (on 3 antihypertensives), migraine, eclampsia with seizure, and palpiatations who [facility] on [facility]21 with complaints of right face arm and leg paresthesias  Patient was alerted as a stroke alert    Blood pressure on arrival 135/88  NIHSS 0  Per chart review, ED physician identifies there was weakness and asymmetry on motor exam     CTH revealed no acute intracranial abnormality  CTA head and neck revealed no evidence of stenosis, aneurysm, or dissection  Patient was deemed to be a tPA candidate and tPA was recommended by neurology attending  However patient refused thrombolytics treatment as she recall this happening in the past and that resolved spontaneously  Subsequently patient was loaded with 325 mg aspirin and initiated on Plavix  Patient was transferred to [facility] to obtain MRI  Patient reports that she has a history of palpitations, she describes as rapid heart beating they usually last for 1-2 minutes  Patient reports that she experiences these multiple times throughout the month  She was taught how to do a Valsalva maneuver, she reports that she was initially on beta-blockers but stopped them because she had to change around her antihypertensive medications  Last 2D echocardiogram in 2019 revealed mild tricuspid regurgitation with EF 70%  Also, patient reports history of migraines  Patient reports that she gets headaches multiple times throughout the weeks, however they do not always progressed to migraines  Her last migraine was 2 days ago  Patient endorses sensitivity to light  Patient denies sensitivity to sound  Patient states she does not take any preventative medications, she does typically use Advil or Excedrin for abortive medications  Patient does not follow with Neurology for management of migraines  At time of my examination patient reports resolution of right-sided paresthesias  Patient reports that her right facial paresthesias improved prior to her transfer to [facility], and her right hand paresthesias improved this morning when she awoke    Patient did endorse that she subsequently had left antecubital pain followed by distal left arm coldness and numbness, which has now resolved  Patient denies headache, dizziness, chest pain, weakness, tingling at this time  Patient is a nonsmoker, uses alcohol socially, denies drug use  Patient reports compliance with home medications  Patient works full-time at [employer], with eye strain, which she says is most often the culprit of her headaches/migraines  Inpatient consult to Neurology  Consult performed by: STACY Pfeiffer  Consult ordered by: LUIS Lester        A 12 system ROS was completed  Other than the above mentioned complaints in the HPI and those commented on below, all remaining systems were negative  Historical Information   Past Medical History:   Diagnosis Date    Hypertension      No past surgical history on file  Social History   Social History     Substance and Sexual Activity   Alcohol Use Yes    Comment: Social     Social History     Substance and Sexual Activity   Drug Use Never     E-Cigarette/Vaping     E-Cigarette/Vaping Substances     Social History     Tobacco Use   Smoking Status Never Smoker   Smokeless Tobacco Never Used     Family History: No family history on file  Review of previous medical records was  completed       Meds/Allergies   all current active meds have been reviewed, current meds:   Current Facility-Administered Medications   Medication Dose Route Frequency    acetaminophen (TYLENOL) tablet 650 mg  650 mg Oral Q4H PRN    aluminum-magnesium hydroxide-simethicone (MYLANTA) oral suspension 30 mL  30 mL Oral Q6H PRN    aspirin chewable tablet 81 mg  81 mg Oral Daily    diphenhydrAMINE (BENADRYL) injection 25 mg  25 mg Intravenous Q8H    ketorolac (TORADOL) injection 15 mg  15 mg Intravenous Q8H    magnesium sulfate IVPB (premix) SOLN 1 g  1 g Intravenous BID    metoclopramide (REGLAN) injection 10 mg  10 mg Intravenous Q8H    ondansetron (ZOFRAN) injection 4 mg  4 mg Intravenous Q6H PRN    and PTA meds:   Prior to Admission Medications Prescriptions Last Dose Informant Patient Reported? Taking? amLODIPine (NORVASC) 10 mg tablet 8/21/2021 at Unknown time  Yes Yes   Sig: Take 10 mg by mouth daily   hydrochlorothiazide (HYDRODIURIL) 25 mg tablet 8/21/2021 at Unknown time  Yes Yes   Sig: TAKE 1 TABLET BY MOUTH EVERY DAY   valsartan (DIOVAN) 80 mg tablet Past Week at Unknown time  Yes Yes   Sig: Take 80 mg by mouth daily      Facility-Administered Medications: None       No Known Allergies    Objective   Vitals:Blood pressure 121/72, pulse 73, temperature 97 9 °F (36 6 °C), temperature source Temporal, resp  rate 18, height 5' 1" (1 549 m), weight 57 8 kg (127 lb 6 8 oz), SpO2 97 %  ,Body mass index is 24 08 kg/m²  No intake or output data in the 24 hours ending 08/22/21 1528    Invasive Devices: Invasive Devices     Peripheral Intravenous Line            Peripheral IV 08/21/21 Right Antecubital <1 day                Physical Exam  Vitals and nursing note reviewed  Constitutional:       General: She is not in acute distress  Appearance: Normal appearance  She is well-developed and normal weight  She is not ill-appearing or diaphoretic  HENT:      Head: Normocephalic and atraumatic  Right Ear: External ear normal       Left Ear: External ear normal       Nose: Nose normal       Mouth/Throat:      Mouth: Mucous membranes are moist    Eyes:      General: No scleral icterus  Extraocular Movements: Extraocular movements intact and EOM normal       Conjunctiva/sclera: Conjunctivae normal       Pupils: Pupils are equal, round, and reactive to light  Cardiovascular:      Rate and Rhythm: Normal rate and regular rhythm  Pulmonary:      Effort: Pulmonary effort is normal  No respiratory distress  Abdominal:      Palpations: Abdomen is soft  Tenderness: There is no abdominal tenderness  Musculoskeletal:         General: No swelling, tenderness, deformity or signs of injury  Normal range of motion        Cervical back: Normal range of motion and neck supple  Right lower leg: No edema  Left lower leg: No edema  Skin:     General: Skin is warm and dry  Capillary Refill: Capillary refill takes less than 2 seconds  [place]: Skin is not jaundiced or pale  Findings: No bruising, erythema, lesion or rash  Neurological:      Mental Status: She is alert and oriented to person, place, and time  Coordination: Heel to Rosita Platt Test normal       Deep Tendon Reflexes: Strength normal       Reflex Scores:       Bicep reflexes are 2+ on the right side and 2+ on the left side  Brachioradialis reflexes are 2+ on the right side and 2+ on the left side  Patellar reflexes are 1+ on the right side and 2+ on the left side  Comments: Detailed neurological exam is outlined below  Psychiatric:         Mood and Affect: Mood normal          Speech: Speech normal        Neurologic Exam     Mental Status   Oriented to person, place, and time  Follows 3 step commands  Attention: normal  Concentration: normal    Speech: speech is normal   Level of consciousness: alert  Knowledge: good  Able to name object  Able to repeat  Cranial Nerves   Cranial nerves II through XII intact  CN II   Visual fields full to confrontation  CN III, IV, VI   Pupils are equal, round, and reactive to light  Extraocular motions are normal    Right pupil: Size: 3 mm  Shape: regular  Left pupil: Size: 3 mm  Shape: regular  Nystagmus: none   Upgaze: normal  Downgaze: normal  Conjugate gaze: present    CN V   Facial sensation intact  CN VII   Facial expression full, symmetric  CN VIII   Hearing: intact    CN IX, X   Palate: symmetric    CN XI   CN XI normal      CN XII   CN XII normal      Motor Exam   Muscle bulk: normal  Overall muscle tone: normal  Right arm pronator drift: absent  Left arm pronator drift: absent    Strength   Strength 5/5 throughout       Sensory Exam   Light touch normal      Gait, Coordination, and Reflexes     Coordination   Heel to shin coordination: normal    Reflexes   Right brachioradialis: 2+  Left brachioradialis: 2+  Right biceps: 2+  Left biceps: 2+  Right patellar: 1+  Left patellar: 2+  Right plantar: equivocal  Left plantar: normal  Gait exam deferred  Finger-to-nose and right upper extremity slower than left upper extremity testing  Rapid finger tapping motion slower in right upper extremity than left  Lab Results:   I have personally reviewed pertinent reports  , CBC:   Results from last 7 days   Lab Units 08/21/21  1913   WBC Thousand/uL 10 10   RBC Million/uL 4 25   HEMOGLOBIN g/dL 13 4   HEMATOCRIT % 39 1*   MCV fL 92   PLATELETS Thousands/uL 268   , BMP/CMP:   Results from last 7 days   Lab Units 08/22/21  0740 08/21/21 1913   SODIUM mmol/L 136 133*   POTASSIUM mmol/L 3 4* 3 3*   CHLORIDE mmol/L 99* 96*   CO2 mmol/L 27 28   BUN mg/dL 17 14   CREATININE mg/dL 0 88 0 91   CALCIUM mg/dL 8 6 10 1   EGFR ml/min/1 73sq m 96 92   , Vitamin B12:   Results from last 7 days   Lab Units 08/22/21  0740   VITAMIN B 12 pg/mL 452   , HgBA1C:   , TSH:   Results from last 7 days   Lab Units 08/22/21  0740   TSH 3RD GENERATON uIU/mL 1 177   , Coagulation:   Results from last 7 days   Lab Units 08/21/21 1913   INR  0 95   , Lipid Profile:   Results from last 7 days   Lab Units 08/22/21  0740   HDL mg/dL 89   LDL CALC mg/dL 99   TRIGLYCERIDES mg/dL 34   , Ammonia:   , Urinalysis:       Invalid input(s): URIBILINOGEN, Drug Screen:   Results from last 7 days   Lab Units 08/22/21  0034   BARBITURATE UR  Negative   BENZODIAZEPINE UR  Negative   THC UR  Negative   COCAINE UR  Negative   METHADONE URINE  Negative   OPIATE UR  Negative   PCP UR  Negative     Imaging Studies: I have personally reviewed pertinent reports  and I have personally reviewed pertinent films in PACS  EKG, Pathology, and Other Studies: I have personally reviewed pertinent reports     and I have personally reviewed pertinent films in PACS  VTE Prophylaxis: Sequential compression device (Venodyne)     Code Status: Level 1 - Full Code    Counseling / Coordination of Care  Total time spent today 56 minutes  Greater than 50% of total time was spent with the patient and/or family counseling and/or coordination of care  A description of the counseling/coordination of care:  Patient was seen and evaluated  Discussed with attending  Chart reviewed thoroughly including laboratory and imaging studies    Plan of care discussed with patient and primary team

## 2021-08-22 NOTE — PLAN OF CARE
Problem: Potential for Falls  Goal: Patient will remain free of falls  Description: INTERVENTIONS:  - Educate patient/family on patient safety including physical limitations  - Instruct patient to call for assistance with activity   - Consult OT/PT to assist with strengthening/mobility   - Keep Call bell within reach  - Keep bed low and locked with side rails adjusted as appropriate  - Keep care items and personal belongings within reach  - Initiate and maintain comfort rounds  - Make Fall Risk Sign visible to staff  - Apply yellow socks and bracelet for high fall risk patients  - Consider moving patient to room near nurses station  Outcome: Progressing     Problem: PAIN - ADULT  Goal: Verbalizes/displays adequate comfort level or baseline comfort level  Description: Interventions:  - Encourage patient to monitor pain and request assistance  - Assess pain using appropriate pain scale  - Administer analgesics based on type and severity of pain and evaluate response  - Implement non-pharmacological measures as appropriate and evaluate response  - Consider cultural and social influences on pain and pain management  - Notify physician/advanced practitioner if interventions unsuccessful or patient reports new pain  Outcome: Progressing     Problem: INFECTION - ADULT  Goal: Absence or prevention of progression during hospitalization  Description: INTERVENTIONS:  - Assess and monitor for signs and symptoms of infection  - Monitor lab/diagnostic results  - Monitor all insertion sites, i e  indwelling lines, tubes, and drains  - Monitor endotracheal if appropriate and nasal secretions for changes in amount and color  - [place] appropriate cooling/warming therapies per order  - Administer medications as ordered  - Instruct and encourage patient and family to use good hand hygiene technique  - Identify and instruct in appropriate isolation precautions for identified infection/condition  Outcome: Progressing     Problem: SAFETY ADULT  Goal: Patient will remain free of falls  Description: INTERVENTIONS:  - Educate patient/family on patient safety including physical limitations  - Instruct patient to call for assistance with activity   - Consult OT/PT to assist with strengthening/mobility   - Keep Call bell within reach  - Keep bed low and locked with side rails adjusted as appropriate  - Keep care items and personal belongings within reach  - Initiate and maintain comfort rounds  - Make Fall Risk Sign visible to staff  - Apply yellow socks and bracelet for high fall risk patients  - Consider moving patient to room near nurses station  Outcome: Progressing  Goal: Maintain or return to baseline ADL function  Description: INTERVENTIONS:  -  Assess patient's ability to carry out ADLs; assess patient's baseline for ADL function and identify physical deficits which impact ability to perform ADLs (bathing, care of mouth/teeth, toileting, grooming, dressing, etc )  - Assess/evaluate cause of self-care deficits   - Assess range of motion  - Assess patient's mobility; develop plan if impaired  - Assess patient's need for assistive devices and provide as appropriate  - Encourage maximum independence but intervene and supervise when necessary  - Involve family in performance of ADLs  - Assess for home care needs following discharge   - Consider OT consult to assist with ADL evaluation and planning for discharge  - Provide patient education as appropriate  Outcome: Progressing  Goal: Maintains/Returns to pre admission functional level  Description: INTERVENTIONS:  - Perform BMAT or MOVE assessment daily    - Set and communicate daily mobility goal to care team and patient/family/caregiver     - Collaborate with rehabilitation services on mobility goals if consulted  - Out of bed for toileting  - Record patient progress and toleration of activity level   Outcome: Progressing     Problem: DISCHARGE PLANNING  Goal: Discharge to home or other facility with appropriate resources  Description: INTERVENTIONS:  - Identify barriers to discharge w/patient and caregiver  - Arrange for needed discharge resources and transportation as appropriate  - Identify discharge learning needs (meds, wound care, etc )  - Arrange for interpretive services to assist at discharge as needed  - Refer to Case Management Department for coordinating discharge planning if the patient needs post-hospital services based on physician/advanced practitioner order or complex needs related to functional status, cognitive ability, or social support system  Outcome: Progressing     Problem: Knowledge Deficit  Goal: Patient/family/caregiver demonstrates understanding of disease process, treatment plan, medications, and discharge instructions  Description: Complete learning assessment and assess knowledge base  Interventions:  - Provide teaching at level of understanding  - Provide teaching via preferred learning methods  Outcome: Progressing     Problem: Neurological Deficit  Goal: Neurological status is stable or improving  Description: Interventions:  - Monitor and assess patient's level of consciousness, motor function, sensory function, and level of assistance needed for ADLs  - Monitor and report changes from baseline  Collaborate with interdisciplinary team to initiate plan and implement interventions as ordered  - Provide and maintain a safe environment  - Consider seizure precautions  - Consider fall precautions  - Consider aspiration precautions  - Consider bleeding precautions  Outcome: Progressing     Problem: Activity Intolerance/Impaired Mobility  Goal: Mobility/activity is maintained at optimum level for patient  Description: Interventions:  - Assess and monitor patient  barriers to mobility and need for assistive/adaptive devices  - Assess patient's emotional response to limitations    - Collaborate with interdisciplinary team and initiate plans and interventions as ordered  - Encourage independent activity per ability   - Maintain proper body alignment  - Perform active/passive rom as tolerated/ordered  - Plan activities to conserve energy   - Turn patient as appropriate  Outcome: Progressing     Problem: Communication Impairment  Goal: Ability to express needs and understand communication  Description: Assess patient's communication skills and ability to understand information  Patient will demonstrate use of effective communication techniques, alternative methods of communication and understanding even if not able to speak  - Encourage communication and provide alternate methods of communication as needed  - Collaborate with case management/ for discharge needs  - Include patient/family/caregiver in decisions related to communication  Outcome: Progressing     Problem: Potential for Aspiration  Goal: Non-ventilated patient's risk of aspiration is minimized  Description: Assess and monitor vital signs, respiratory status, and labs (WBC)  Monitor for signs of aspiration (tachypnea, cough, rales, wheezing, cyanosis, fever)  - Assess and monitor patient's ability to swallow  - Place patient up in chair to eat if possible  - HOB up at 90 degrees to eat if unable to get patient up into chair   - Supervise patient during oral intake  - Instruct patient/ family to take small bites  - Instruct patient/ family to take small single sips when taking liquids  - Follow patient-specific strategies generated by speech pathologist   Outcome: Progressing  Goal: Ventilated patient's risk of aspiration is minimized  Description: Assess and monitor vital signs, respiratory status, airway cuff pressure, and labs (WBC)  Monitor for signs of aspiration (tachypnea, cough, rales, wheezing, cyanosis, fever)  - Elevate head of bed 30 degrees if patient has tube feeding   - Monitor tube feeding    Outcome: Progressing     Problem: Nutrition  Goal: Nutrition/Hydration status is improving  Description: Monitor and assess patient's nutrition/hydration status for malnutrition (ex- brittle hair, bruises, dry skin, pale skin and conjunctiva, muscle wasting, smooth red tongue, and disorientation)  Collaborate with interdisciplinary team and initiate plan and interventions as ordered  Monitor patient's weight and dietary intake as ordered or per policy  Utilize nutrition screening tool and intervene per policy  Determine patient's food preferences and provide high-protein, high-caloric foods as appropriate  - Assist patient with eating   - Allow adequate time for meals   - Encourage patient to take dietary supplement as ordered  - Collaborate with clinical nutritionist   - Include patient/family/caregiver in decisions related to nutrition    Outcome: Progressing

## 2021-08-23 ENCOUNTER — TELEPHONE (OUTPATIENT)
Dept: NEUROLOGY | Facility: CLINIC | Age: 41
End: 2021-08-23

## 2021-08-23 VITALS
SYSTOLIC BLOOD PRESSURE: 143 MMHG | HEIGHT: 61 IN | BODY MASS INDEX: 24.06 KG/M2 | RESPIRATION RATE: 18 BRPM | WEIGHT: 127.43 LBS | OXYGEN SATURATION: 100 % | HEART RATE: 68 BPM | TEMPERATURE: 97.8 F | DIASTOLIC BLOOD PRESSURE: 94 MMHG

## 2021-08-23 LAB
ANION GAP SERPL CALCULATED.3IONS-SCNC: 9 MMOL/L (ref 4–13)
BASOPHILS # BLD AUTO: 0.02 THOUSANDS/ΜL (ref 0–0.1)
BASOPHILS NFR BLD AUTO: 0 % (ref 0–1)
BUN SERPL-MCNC: 23 MG/DL (ref 5–25)
CALCIUM SERPL-MCNC: 7.9 MG/DL (ref 8.3–10.1)
CHLORIDE SERPL-SCNC: 105 MMOL/L (ref 100–108)
CO2 SERPL-SCNC: 24 MMOL/L (ref 21–32)
CREAT SERPL-MCNC: 0.77 MG/DL (ref 0.6–1.3)
EOSINOPHIL # BLD AUTO: 0.26 THOUSAND/ΜL (ref 0–0.61)
EOSINOPHIL NFR BLD AUTO: 3 % (ref 0–6)
ERYTHROCYTE [DISTWIDTH] IN BLOOD BY AUTOMATED COUNT: 12.3 % (ref 11.6–15.1)
GFR SERPL CREATININE-BSD FRML MDRD: 112 ML/MIN/1.73SQ M
GLUCOSE SERPL-MCNC: 97 MG/DL (ref 65–140)
HCT VFR BLD AUTO: 34.6 % (ref 34.8–46.1)
HGB BLD-MCNC: 11.6 G/DL (ref 11.5–15.4)
IMM GRANULOCYTES # BLD AUTO: 0.02 THOUSAND/UL (ref 0–0.2)
IMM GRANULOCYTES NFR BLD AUTO: 0 % (ref 0–2)
LYMPHOCYTES # BLD AUTO: 2.94 THOUSANDS/ΜL (ref 0.6–4.47)
LYMPHOCYTES NFR BLD AUTO: 37 % (ref 14–44)
MCH RBC QN AUTO: 30.8 PG (ref 26.8–34.3)
MCHC RBC AUTO-ENTMCNC: 33.5 G/DL (ref 31.4–37.4)
MCV RBC AUTO: 92 FL (ref 82–98)
MONOCYTES # BLD AUTO: 0.47 THOUSAND/ΜL (ref 0.17–1.22)
MONOCYTES NFR BLD AUTO: 6 % (ref 4–12)
NEUTROPHILS # BLD AUTO: 4.26 THOUSANDS/ΜL (ref 1.85–7.62)
NEUTS SEG NFR BLD AUTO: 54 % (ref 43–75)
NRBC BLD AUTO-RTO: 0 /100 WBCS
PLATELET # BLD AUTO: 199 THOUSANDS/UL (ref 149–390)
PMV BLD AUTO: 11.4 FL (ref 8.9–12.7)
POTASSIUM SERPL-SCNC: 3.9 MMOL/L (ref 3.5–5.3)
RBC # BLD AUTO: 3.77 MILLION/UL (ref 3.81–5.12)
SODIUM SERPL-SCNC: 138 MMOL/L (ref 136–145)
WBC # BLD AUTO: 7.97 THOUSAND/UL (ref 4.31–10.16)

## 2021-08-23 PROCEDURE — 99239 HOSP IP/OBS DSCHRG MGMT >30: CPT | Performed by: INTERNAL MEDICINE

## 2021-08-23 PROCEDURE — 85025 COMPLETE CBC W/AUTO DIFF WBC: CPT | Performed by: INTERNAL MEDICINE

## 2021-08-23 PROCEDURE — 80048 BASIC METABOLIC PNL TOTAL CA: CPT | Performed by: INTERNAL MEDICINE

## 2021-08-23 RX ORDER — VALSARTAN 80 MG/1
80 TABLET ORAL DAILY
Refills: 0
Start: 2021-08-26

## 2021-08-23 RX ADMIN — MAGNESIUM SULFATE HEPTAHYDRATE 1 G: 1 INJECTION, SOLUTION INTRAVENOUS at 08:29

## 2021-08-23 RX ADMIN — DIPHENHYDRAMINE HYDROCHLORIDE 25 MG: 50 INJECTION, SOLUTION INTRAMUSCULAR; INTRAVENOUS at 07:41

## 2021-08-23 RX ADMIN — METOCLOPRAMIDE 10 MG: 5 INJECTION, SOLUTION INTRAMUSCULAR; INTRAVENOUS at 07:42

## 2021-08-23 RX ADMIN — KETOROLAC TROMETHAMINE 15 MG: 30 INJECTION, SOLUTION INTRAMUSCULAR; INTRAVENOUS at 07:41

## 2021-08-23 RX ADMIN — ASPIRIN 81 MG: 81 TABLET, CHEWABLE ORAL at 08:29

## 2021-08-23 NOTE — ASSESSMENT & PLAN NOTE
Patient presents with right-sided facial weakness and right-sided body weakness/numbness  States she took a nap at 4:00 p m , woke up with numbness her right face and right body but was laying on her right side and thought that was the cause, rolled over and went back to sleep  Woke back up later with symptoms still present prompting patient to come to the ED  Also endorses insomnia ( avg 2-3hrs of sleep/night) and random pain in her veins (arms, legs, temple)  Denies fevers, chills, vision changes, n/v   Evaluated at [facility] with neuro consult who recommended tPA, but patient refused as she has had this happened to her before and it resolved on its own  Transferred to [facility] for inpatient neurology assessment and brain MRI  On arrival to [facility], patient's symptoms almost completely resolved  VS stable  On exam: No focal neuro deficits  CN II-XII intact  Heel-to-shin negative, finger-to-nose negative  Mild hypokalemia at 3 4, would not explain her symptoms  UDS negative  CT head- no acute intracranial abnormalities  CTA head/neck - No evidence of hemodynamic significant stenosis, aneurysm or dissection  Given ipsilateral findings and resolution of symptoms, CVA is less likely  Cannot exclude TIA but if this was a R sided ischemic event with R sided facial numbness, would expect contralateral body symptoms  Pt does not have occular findings, but is in the right demographic for MS  (pt denies family hx of autoimmune disorders)    Plan:  - Continue stroke pathway for now with neurochecks q4h  - ASA 81mg daily  - Pt gets regular blood work with PCP, no evidence of hyperlipidemia, will hold off on statin  - Will recheck TSH  Also added vitamin B1,B6,and B12 levels  - Neuro consult  Appreciate recs

## 2021-08-23 NOTE — DISCHARGE SUMMARY
[facility]  Discharge- Franny Acevedo 1980, 45 y o  female MRN: [id number]  Unit/Bed#: E4 -01 Encounter: [id number]  Primary Care Provider: Kaveh Velasquez MD   Date and time admitted to hospital: 8/22/2021  4:11 AM    * Stroke-like symptoms  Assessment & Plan  Patient presents with right-sided facial weakness and right-sided body weakness/numbness  States she took a nap at 4:00 p m , woke up with numbness her right face and right body but was laying on her right side and thought that was the cause, rolled over and went back to sleep  Woke back up later with symptoms still present prompting patient to come to the ED  Also endorses insomnia ( avg 2-3hrs of sleep/night) and random pain in her veins (arms, legs, temple)  Denies fevers, chills, vision changes, n/v   Evaluated at [facility] with neuro consult who recommended tPA, but patient refused as she has had this happened to her before and it resolved on its own  Transferred to [facility] for inpatient neurology assessment and brain MRI  On arrival to [facility], patient's symptoms almost completely resolved  VS stable  On exam: No focal neuro deficits  CN II-XII intact  Heel-to-shin negative, finger-to-nose negative  Mild hypokalemia at 3 4, would not explain her symptoms  UDS negative  CT head- no acute intracranial abnormalities  CTA head/neck - No evidence of hemodynamic significant stenosis, aneurysm or dissection  Plan:  - neurology believed likely secondary to migraine, stroke pathway discontinued  - symptoms resolved    HTN (hypertension)  Assessment & Plan  Patient is on amlodipine, HCTZ, valsartan  Patient has had borderline low blood pressure on discharge will recommend to continue amlodipine and hydrochlorothiazide and hold the valsartan until follow-up with PCP    Counseled patient on monitor blood pressure at home and keeping a log      Transition of Care Discharge Summary - [facility]    Patient Information: Davina Flores 45 y o  female MRN: [id number]  Unit/Bed#: E4 -01 Encounter: [id number]    Discharging Physician / Practitioner: Elizabeth Matos MD  PCP: Uzma Watts MD  Admission Date: 8/22/2021  Discharge Date: 08/23/21    Disposition:      Other: home      Reason for Admission:  Right-sided facial numbness and right-sided body numbness and weakness    Discharge Diagnoses:     Principal Problem:    Stroke-like symptoms  Active Problems:    HTN (hypertension)  Resolved Problems:    * No resolved hospital problems  *      Consultations During Hospital Stay:  · IP CONSULT TO NEUROLOGY      Procedures Performed:     · none    Medication Adjustments and Discharge Medications:  · Medication Dosing Tapers - Please refer to Discharge Medication List for details on any medication dosing tapers (if applicable to patient)  · Discharge Medication List: See after visit summary for reconciled discharge medications  Wound Care Recommendations:  When applicable, please see wound care section of After Visit Summary  Diet Recommendations at Discharge:  Diet -        Diet Orders   (From admission, onward)             Start     Ordered    08/22/21 0549  Diet Regular; Regular House  Diet effective now     Question Answer Comment   Diet Type Regular    Regular Regular House    RD to adjust diet per protocol? Yes        08/22/21 0648              Fluid Restriction - No Fluid Restriction at Discharge  Significant Findings / Test Results:     · CT Head: negative  · MRI: unremarkable      Hospital Course:     Davina Flores is a 45 y o  female patient who originally presented to the hospital on 8/22/2021 due to stroke-like symptoms consisting of right side facial numbness and weakness  CT head, MRI negative, neurology consulted patient's symptoms likely secondary to complex migraine was treated with migraine cocktail, symptoms have now resolved  Please see above problem list for further details        Condition at Discharge: good     Discharge Day Visit / Exam:     Subjective:  Patient seen examined at bedside, denies any complaints, states she feels better    Vitals: Blood Pressure: 143/94 (08/23/21 1100)  Pulse: 68 (08/23/21 1100)  Temperature: 97 8 °F (36 6 °C) (08/23/21 0752)  Temp Source: Temporal (08/23/21 0752)  Respirations: 18 (08/23/21 1100)  Height: 5' 1" (154 9 cm) (08/22/21 0400)  Weight - Scale: 57 8 kg (127 lb 6 8 oz) (08/22/21 0400)  SpO2: 100 % (08/23/21 1100)    Physical Exam:    Constitutional: Patient is oriented to person, place and time, no acute distress  HEENT:  Normocephalic, atraumatic  Cardiovascular: Normal S1S2, RRR, No murmurs/rubs/gallops appreciated  Pulmonary:  Bilateral air entry, No rhonchi/rales/wheezing appreciated  Abdominal: Soft, Bowel sounds present, Non-tender, Non-distended  Extremities:  No cyanosis, clubbing or edema  Neurological: Cranial nerves II-XII grossly intact, sensation intact, otherwise no focal neurological symptoms  Discharge instructions/Information to patient and family:   See after visit summary section titled Discharge Instructions for information provided to patient and family  Planned Readmission: no     Discharge Statement:  I spent 25 minutes discharging the patient  This time was spent on the day of discharge  I had direct contact with the patient on the day of discharge  Greater than 50% of the total time was spent examining patient, answering all patient questions, arranging and discussing plan of care with patient as well as directly providing post-discharge instructions  Additional time then spent on discharge activities      ** Please Note: This note has been constructed using a voice recognition system **

## 2021-08-23 NOTE — TELEPHONE ENCOUNTER
Sched HFU appt 11/12/2021 with Dr Zohaib Rincon in CV  SLA/Stroke Like Sx/[employer] P[employer]    NOTE FROM CHART:  Reason for Consult / Principal Problem: stroke like symptoms  Wilbert Mcfadden can follow up with outpatient neurology, headache specialist,  if she is interested in ongoing headache and migraine management

## 2021-08-23 NOTE — ASSESSMENT & PLAN NOTE
Patient is on amlodipine, HCTZ, valsartan  BP on arrival to [facility] is 100/62 (states this is abnormally low for her)  Will hold antihypertensives until BP improves

## 2021-08-23 NOTE — UTILIZATION REVIEW
Initial Clinical Review    Admission: Date/Time/Statement:   Admission Orders (From admission, onward)     Ordered        08/22/21 0648  Inpatient Admission  Once                   Orders Placed This Encounter   Procedures    Inpatient Admission     Standing Status:   Standing     Number of Occurrences:   1     Order Specific Question:   Level of Care     Answer:   Med Surg [16]     Order Specific Question:   Estimated length of stay     Answer:   More than 2 Midnights     Order Specific Question:   Certification     Answer:   I certify that inpatient services are medically necessary for this patient for a duration of greater than two midnights  See H&P and MD Progress Notes for additional information about the patient's course of treatment  Initial Presentation: 44 y/o female with PMH HTN transferred from [facility] ED to [facility] d/t right-sided facial weakness and right-sided paresthesias  Neurology consulted and recommended admission for thrombolytics and post tPA care and stroke evaluation  Pt states she has had similar symptoms and recovered well, and she does not want tPA  Will admit for stroke protocol, SLIM requests transfer as patient was inside window, young, and would benefit from in person neuro consult  Admit Inpatient med surg telemetry for stroke-like symptoms  On arrival to [facility], patient's symptoms almost completely resolved  Given findings and resolution of symptoms, CVA is less likely  Cannot exclude TIA but if this was a R sided ischemic event with R sided facial numbness, would expect contralateral body symptoms  Pt does not have occular findings, but is in the right demographic for MS  Continue stroke pathway, MRI, neurology consult  start ASA, recheck labs, IVF     8/22 Neurology Consult:  Pt with HTN, eclampsia, migraines, presenting with R sided sensory symptoms, also with motor symptoms on initial exam in the ED, now all resolved    Her exam is normal, and her MRI does not show a stroke  Suspect migraine with sensory and motor aura  OK to discontinue the remainder of the stroke pathway  Will give migraine cocktail today, and give melatonin for sleep  If doing well, no further inpatient neurology recommendations  Would continue on melatonin at home, and instruct her to keep a headache diary to review of follow up  She did request follow up with  neurology  Date: 8/23   Day 2: Discharge to home  CT head, MRI negative, neurology consulted patient's symptoms likely secondary to complex migraine was treated with migraine cocktail, symptoms have now resolved  ED Triage Vitals [08/22/21 0400]   Temperature Pulse Respirations Blood Pressure SpO2   98 2 °F (36 8 °C) 69 18 113/75 98 %      Temp Source Heart Rate Source Patient Position - Orthostatic VS BP Location FiO2 (%)   Temporal Monitor Sitting Left arm --      Pain Score       No Pain          Wt Readings from Last 1 Encounters:   08/22/21 57 8 kg (127 lb 6 8 oz)     Additional Vital Signs:   Date/Time  Temp  Pulse  Resp  BP  MAP (mmHg)  SpO2  O2 Device   08/23/21 1100  --  68  18  143/94  113  100 %  None (Room air)   08/23/21 0752  97 8 °F (36 6 °C)  80  18  135/80  103  100 %  None (Room air)   08/23/21 0700  --  62  --  94/64  --  --  --   08/23/21 0324  98 4 °F (36 9 °C)  59  16  83/53Abnormal   63  98 %  --   08/22/21 2333  97 7 °F (36 5 °C)  74  18  96/51  67  98 %  --   08/22/21 1945  97 4 °F (36 3 °C)Abnormal   68  17  119/76  93  97 %  --   08/22/21 1505  97 9 °F (36 6 °C)  73  18  121/72  92  97 %  None (Room air)   08/22/21 1100  --  63  --  106/63  --  100 %  --   08/22/21 0900  --  69  --  108/70  --  98 %  --   08/22/21 0500  98 3 °F (36 8 °C)  74  18  100/69  80  98 %  None (Room air)     Pertinent Labs/Diagnostic Test Results:   8/21 CT HEAD:  No acute intracranial abnormality  8/21 CTA STROKE ALERT HEAD/NECK:  No evidence of hemodynamic significant stenosis, aneurysm or dissection    8/22 MRI BRAIN:  Unremarkable MRI of the brain    8/21 EKG:  Normal sinus rhythm  Possible Left atrial enlargement  T wave abnormality, consider anterior ischemia  Abnormal ECG    Results from last 7 days   Lab Units 08/21/21  1938   SARS-COV-2  Negative     Results from last 7 days   Lab Units 08/23/21  0541 08/21/21  1913   WBC Thousand/uL 7 97 10 10   HEMOGLOBIN g/dL 11 6 13 4   HEMATOCRIT % 34 6* 39 1*   PLATELETS Thousands/uL 199 268   NEUTROS ABS Thousands/µL 4 26  --      Results from last 7 days   Lab Units 08/23/21  0541 08/22/21  0740 08/21/21 1913   SODIUM mmol/L 138 136 133*   POTASSIUM mmol/L 3 9 3 4* 3 3*   CHLORIDE mmol/L 105 99* 96*   CO2 mmol/L 24 27 28   ANION GAP mmol/L 9 10 9   BUN mg/dL 23 17 14   CREATININE mg/dL 0 77 0 88 0 91   EGFR ml/min/1 73sq m 112 96 92   CALCIUM mg/dL 7 9* 8 6 10 1   MAGNESIUM mg/dL  --  2 1  --    PHOSPHORUS mg/dL  --  4 7*  --      Results from last 7 days   Lab Units 08/21/21  1908   POC GLUCOSE mg/dl 114     Results from last 7 days   Lab Units 08/23/21  0541 08/22/21  0740 08/21/21 1913   GLUCOSE RANDOM mg/dL 97 97 106*     Results from last 7 days   Lab Units 08/22/21  0740   HEMOGLOBIN A1C % 5 4   EAG mg/dl 108     Results from last 7 days   Lab Units 08/21/21  1913   TROPONIN I ng/mL <0 03     Results from last 7 days   Lab Units 08/21/21  1913   PROTIME seconds 12 6   INR  0 95   PTT seconds 26     Results from last 7 days   Lab Units 08/22/21  0740   TSH 3RD GENERATON uIU/mL 1 177       Results from last 7 days   Lab Units 08/22/21  0034   AMPH/METH  Negative   BARBITURATE UR  Negative   BENZODIAZEPINE UR  Negative   COCAINE UR  Negative   METHADONE URINE  Negative   OPIATE UR  Negative   PCP UR  Negative   THC UR  Negative     Past Medical History:   Diagnosis Date    Hypertension      Present on Admission:  **None**      Admitting Diagnosis: Paresthesia [R20 2]  Weakness [R53 1]  Age/Sex: 45 y o  female  Admission Orders:  Medications   aspirin chewable tablet 81 mg   Dose: 81 mg  Freq: Daily Route: PO  Start: 08/22/21 0900 End: 08/23/21 1450      aspirin tablet 325 mg   Dose: 325 mg  Freq: Once Route: PO  Start: 08/21/21 2230 End: 08/21/21 2230      atorvastatin (LIPITOR) tablet 40 mg   Dose: 40 mg  Freq: Daily with dinner Route: PO  Start: 08/22/21 1630 End: 08/22/21 0411      clopidogrel (PLAVIX) tablet 75 mg   Dose: 75 mg  Freq: Once Route: PO  Start: 08/21/21 2230 End: 08/21/21 2230      diphenhydrAMINE (BENADRYL) injection 25 mg   Dose: 25 mg  Freq: Every 8 hours Route: IV  Start: 08/22/21 1445 End: 08/23/21 0741      ketorolac (TORADOL) injection 15 mg   Dose: 15 mg  Freq: Every 8 hours Route: IV  Start: 08/22/21 1445 End: 08/23/21 1450      magnesium sulfate IVPB (premix) SOLN 1 g   Dose: 1 g  Freq: 2 times daily Route: IV  Last Dose: Stopped (08/23/21 0947)  Start: 08/22/21 1445 End: 08/23/21 0947      melatonin tablet 6 mg   Dose: 6 mg  Freq: Daily at bedtime Route: PO  Start: 08/22/21 2200 End: 08/23/21 1450      metoclopramide (REGLAN) injection 10 mg   Dose: 10 mg  Freq: Every 8 hours Route: IV  Start: 08/22/21 1445 End: 08/23/21 0742      potassium chloride (K-DUR,KLOR-CON) CR tablet 20 mEq   Dose: 20 mEq  Freq: Once Route: PO  Start: 08/22/21 1645 End: 08/22/21 1803    Admin Instructions:   Swallow whole; do not crush or chew  Tablet may be split in half to facilitate swallowing  potassium chloride (K-DUR,KLOR-CON) CR tablet 20 mEq   Dose: 20 mEq  Freq: Once Route: PO  Start: 08/22/21 0700 End: 08/22/21 0908    Admin Instructions:   Swallow whole; do not crush or chew  Tablet may be split in half to facilitate swallowing        Medications   Continuous Meds Sorted by Name      PRN Meds Sorted by Name    Medications   acetaminophen (TYLENOL) tablet 650 mg   Dose: 650 mg  Freq: Every 4 hours PRN Route: PO  PRN Reason: mild pain  Indications of Use: FEVER,HEADACHE,MILD PAIN  Start: 08/22/21 0552 End: 08/23/21 1450      aluminum-magnesium hydroxide-simethicone (MYLANTA) oral suspension 30 mL   Dose: 30 mL  Freq: Every 6 hours PRN Route: PO  PRN Reasons: indigestion,heartburn  Start: 08/22/21 0552 End: 08/23/21 1450      iohexol (OMNIPAQUE) 350 MG/ML injection (SINGLE-DOSE) 85 mL   Dose: 85 mL  Freq: Once in imaging Route: IV  PRN Reason: contrast  Start: 08/21/21 1926 End: 08/21/21 1926      ondansetron (ZOFRAN) injection 4 mg   Dose: 4 mg  Freq: Every 6 hours PRN Route: IV  PRN Reasons: nausea,vomiting  Start: 08/22/21 0552 End: 08/23/21 1450          IP CONSULT TO NEUROLOGY    Network Utilization Review Department  ATTENTION: Please call with any questions or concerns to [phone] and carefully listen to the prompts so that you are directed to the right person  All voicemails are confidential   Adrienne Blackwell all requests for admission clinical reviews, approved or denied determinations and any other requests to dedicated fax number below belonging to the campus where the patient is receiving treatment   List of dedicated fax numbers for the Facilities:  [address] DENIALS (Administrative/Medical Necessity) [phone]   [address] (Maternity/NICU/Pediatrics) [phone]   [address] [place] [id number] [place] [address] [address] [facility] [place] [address] [phone]

## 2021-08-23 NOTE — PLAN OF CARE
Problem: Potential for Falls  Goal: Patient will remain free of falls  Description: INTERVENTIONS:  - Educate patient/family on patient safety including physical limitations  - Instruct patient to call for assistance with activity   - Consult OT/PT to assist with strengthening/mobility   - Keep Call bell within reach  - Keep bed low and locked with side rails adjusted as appropriate  - Keep care items and personal belongings within reach  - Initiate and maintain comfort rounds  - Make Fall Risk Sign visible to staff  - Offer Toileting every Hours, in advance of need  - Initiate/Maintain alarm  - Obtain necessary fall risk management equipment:   - Apply yellow socks and bracelet for high fall risk patients  - Consider moving patient to room near nurses station  Outcome: Progressing     Problem: PAIN - ADULT  Goal: Verbalizes/displays adequate comfort level or baseline comfort level  Description: Interventions:  - Encourage patient to monitor pain and request assistance  - Assess pain using appropriate pain scale  - Administer analgesics based on type and severity of pain and evaluate response  - Implement non-pharmacological measures as appropriate and evaluate response  - Consider cultural and social influences on pain and pain management  - Notify physician/advanced practitioner if interventions unsuccessful or patient reports new pain  Outcome: Progressing     Problem: INFECTION - ADULT  Goal: Absence or prevention of progression during hospitalization  Description: INTERVENTIONS:  - Assess and monitor for signs and symptoms of infection  - Monitor lab/diagnostic results  - Monitor all insertion sites, i e  indwelling lines, tubes, and drains  - Monitor endotracheal if appropriate and nasal secretions for changes in amount and color  - [place] appropriate cooling/warming therapies per order  - Administer medications as ordered  - Instruct and encourage patient and family to use good hand hygiene technique  - Identify and instruct in appropriate isolation precautions for identified infection/condition  Outcome: Progressing     Problem: SAFETY ADULT  Goal: Patient will remain free of falls  Description: INTERVENTIONS:  - Educate patient/family on patient safety including physical limitations  - Instruct patient to call for assistance with activity   - Consult OT/PT to assist with strengthening/mobility   - Keep Call bell within reach  - Keep bed low and locked with side rails adjusted as appropriate  - Keep care items and personal belongings within reach  - Initiate and maintain comfort rounds  - Make Fall Risk Sign visible to staff  - Offer Toileting every  Hours, in advance of need  - Initiate/Maintain alarm  - Obtain necessary fall risk management equipment:   - Apply yellow socks and bracelet for high fall risk patients  - Consider moving patient to room near nurses station  Outcome: Progressing  Goal: Maintain or return to baseline ADL function  Description: INTERVENTIONS:  -  Assess patient's ability to carry out ADLs; assess patient's baseline for ADL function and identify physical deficits which impact ability to perform ADLs (bathing, care of mouth/teeth, toileting, grooming, dressing, etc )  - Assess/evaluate cause of self-care deficits   - Assess range of motion  - Assess patient's mobility; develop plan if impaired  - Assess patient's need for assistive devices and provide as appropriate  - Encourage maximum independence but intervene and supervise when necessary  - Involve family in performance of ADLs  - Assess for home care needs following discharge   - Consider OT consult to assist with ADL evaluation and planning for discharge  - Provide patient education as appropriate  Outcome: Progressing  Goal: Maintains/Returns to pre admission functional level  Description: INTERVENTIONS:  - Perform BMAT or MOVE assessment daily    - Set and communicate daily mobility goal to care team and patient/family/caregiver  - Collaborate with rehabilitation services on mobility goals if consulted  - Perform Range of Motion times a day  - Reposition patient every hours  - Dangle patient  times a day  - Stand patient  times a day  - Ambulate patient times a day  - Out of bed to chair  times a day   - Out of bed for meals  times a day  - Out of bed for toileting  - Record patient progress and toleration of activity level   Outcome: Progressing     Problem: DISCHARGE PLANNING  Goal: Discharge to home or other facility with appropriate resources  Description: INTERVENTIONS:  - Identify barriers to discharge w/patient and caregiver  - Arrange for needed discharge resources and transportation as appropriate  - Identify discharge learning needs (meds, wound care, etc )  - Arrange for interpretive services to assist at discharge as needed  - Refer to Case Management Department for coordinating discharge planning if the patient needs post-hospital services based on physician/advanced practitioner order or complex needs related to functional status, cognitive ability, or social support system  Outcome: Progressing     Problem: Knowledge Deficit  Goal: Patient/family/caregiver demonstrates understanding of disease process, treatment plan, medications, and discharge instructions  Description: Complete learning assessment and assess knowledge base  Interventions:  - Provide teaching at level of understanding  - Provide teaching via preferred learning methods  Outcome: Progressing     Problem: Neurological Deficit  Goal: Neurological status is stable or improving  Description: Interventions:  - Monitor and assess patient's level of consciousness, motor function, sensory function, and level of assistance needed for ADLs  - Monitor and report changes from baseline  Collaborate with interdisciplinary team to initiate plan and implement interventions as ordered  - Provide and maintain a safe environment  - Consider seizure precautions    - Consider fall precautions  - Consider aspiration precautions  - Consider bleeding precautions  Outcome: Progressing     Problem: Activity Intolerance/Impaired Mobility  Goal: Mobility/activity is maintained at optimum level for patient  Description: Interventions:  - Assess and monitor patient  barriers to mobility and need for assistive/adaptive devices  - Assess patient's emotional response to limitations  - Collaborate with interdisciplinary team and initiate plans and interventions as ordered  - Encourage independent activity per ability   - Maintain proper body alignment  - Perform active/passive rom as tolerated/ordered  - Plan activities to conserve energy   - Turn patient as appropriate  Outcome: Progressing     Problem: Communication Impairment  Goal: Ability to express needs and understand communication  Description: Assess patient's communication skills and ability to understand information  Patient will demonstrate use of effective communication techniques, alternative methods of communication and understanding even if not able to speak  - Encourage communication and provide alternate methods of communication as needed  - Collaborate with case management/ for discharge needs  - Include patient/family/caregiver in decisions related to communication  Outcome: Progressing     Problem: Potential for Aspiration  Goal: Non-ventilated patient's risk of aspiration is minimized  Description: Assess and monitor vital signs, respiratory status, and labs (WBC)  Monitor for signs of aspiration (tachypnea, cough, rales, wheezing, cyanosis, fever)  - Assess and monitor patient's ability to swallow  - Place patient up in chair to eat if possible  - HOB up at 90 degrees to eat if unable to get patient up into chair   - Supervise patient during oral intake  - Instruct patient/ family to take small bites  - Instruct patient/ family to take small single sips when taking liquids    - Follow patient-specific strategies generated by speech pathologist   Outcome: Progressing  Goal: Ventilated patient's risk of aspiration is minimized  Description: Assess and monitor vital signs, respiratory status, airway cuff pressure, and labs (WBC)  Monitor for signs of aspiration (tachypnea, cough, rales, wheezing, cyanosis, fever)  - Elevate head of bed 30 degrees if patient has tube feeding   - Monitor tube feeding  Outcome: Progressing     Problem: Nutrition  Goal: Nutrition/Hydration status is improving  Description: Monitor and assess patient's nutrition/hydration status for malnutrition (ex- brittle hair, bruises, dry skin, pale skin and conjunctiva, muscle wasting, smooth red tongue, and disorientation)  Collaborate with interdisciplinary team and initiate plan and interventions as ordered  Monitor patient's weight and dietary intake as ordered or per policy  Utilize nutrition screening tool and intervene per policy  Determine patient's food preferences and provide high-protein, high-caloric foods as appropriate  - Assist patient with eating   - Allow adequate time for meals   - Encourage patient to take dietary supplement as ordered  - Collaborate with clinical nutritionist   - Include patient/family/caregiver in decisions related to nutrition    Outcome: Progressing

## 2021-08-25 LAB — VIT B1 BLD-SCNC: 117.6 NMOL/L (ref 66.5–200)

## 2021-08-26 LAB — VIT B6 SERPL-MCNC: 7 UG/L (ref 2–32.8)

## 2021-11-05 ENCOUNTER — TELEPHONE (OUTPATIENT)
Dept: NEUROLOGY | Facility: CLINIC | Age: 41
End: 2021-11-05